# Patient Record
Sex: MALE | Race: WHITE | Employment: OTHER | URBAN - METROPOLITAN AREA
[De-identification: names, ages, dates, MRNs, and addresses within clinical notes are randomized per-mention and may not be internally consistent; named-entity substitution may affect disease eponyms.]

---

## 2017-11-13 PROBLEM — I48.91 ATRIAL FIBRILLATION WITH RVR (HCC): Status: ACTIVE | Noted: 2017-01-01

## 2017-11-15 PROBLEM — I50.43 ACUTE ON CHRONIC COMBINED SYSTOLIC AND DIASTOLIC CONGESTIVE HEART FAILURE (HCC): Status: ACTIVE | Noted: 2017-01-01

## 2017-11-15 PROBLEM — R41.0 ACUTE DELIRIUM: Status: ACTIVE | Noted: 2017-01-01

## 2018-01-01 ENCOUNTER — APPOINTMENT (OUTPATIENT)
Dept: GENERAL RADIOLOGY | Age: 83
DRG: 686 | End: 2018-01-01
Payer: MEDICARE

## 2018-01-01 ENCOUNTER — ANESTHESIA (OUTPATIENT)
Dept: OPERATING ROOM | Age: 83
End: 2018-01-01

## 2018-01-01 ENCOUNTER — HOSPITAL ENCOUNTER (INPATIENT)
Age: 83
LOS: 5 days | DRG: 686 | End: 2018-03-16
Attending: EMERGENCY MEDICINE | Admitting: INTERNAL MEDICINE
Payer: MEDICARE

## 2018-01-01 ENCOUNTER — APPOINTMENT (OUTPATIENT)
Dept: CT IMAGING | Age: 83
DRG: 686 | End: 2018-01-01
Payer: MEDICARE

## 2018-01-01 ENCOUNTER — APPOINTMENT (OUTPATIENT)
Dept: ULTRASOUND IMAGING | Age: 83
DRG: 686 | End: 2018-01-01
Payer: MEDICARE

## 2018-01-01 ENCOUNTER — ANESTHESIA EVENT (OUTPATIENT)
Dept: OPERATING ROOM | Age: 83
End: 2018-01-01

## 2018-01-01 VITALS
BODY MASS INDEX: 32.29 KG/M2 | HEIGHT: 72 IN | DIASTOLIC BLOOD PRESSURE: 90 MMHG | OXYGEN SATURATION: 94 % | HEART RATE: 98 BPM | WEIGHT: 238.4 LBS | RESPIRATION RATE: 24 BRPM | TEMPERATURE: 98.7 F | SYSTOLIC BLOOD PRESSURE: 137 MMHG

## 2018-01-01 DIAGNOSIS — J18.9 PNEUMONIA DUE TO ORGANISM: ICD-10-CM

## 2018-01-01 DIAGNOSIS — R52 PAIN: ICD-10-CM

## 2018-01-01 DIAGNOSIS — R40.4 TRANSIENT ALTERATION OF AWARENESS: Primary | ICD-10-CM

## 2018-01-01 DIAGNOSIS — R31.0 GROSS HEMATURIA: ICD-10-CM

## 2018-01-01 LAB
ALBUMIN SERPL-MCNC: 3.8 G/DL (ref 3.5–5.2)
ALP BLD-CCNC: 81 U/L (ref 40–129)
ALT SERPL-CCNC: 16 U/L (ref 0–40)
ANION GAP SERPL CALCULATED.3IONS-SCNC: 11 MMOL/L (ref 7–16)
ANION GAP SERPL CALCULATED.3IONS-SCNC: 13 MMOL/L (ref 7–16)
ANION GAP SERPL CALCULATED.3IONS-SCNC: 9 MMOL/L (ref 7–16)
ANION GAP SERPL CALCULATED.3IONS-SCNC: 9 MMOL/L (ref 7–16)
AST SERPL-CCNC: 34 U/L (ref 0–39)
B.E.: 7.6 MMOL/L (ref -3–3)
BACTERIA: ABNORMAL /HPF
BASOPHILS ABSOLUTE: 0.04 E9/L (ref 0–0.2)
BASOPHILS ABSOLUTE: 0.04 E9/L (ref 0–0.2)
BASOPHILS RELATIVE PERCENT: 0.5 % (ref 0–2)
BASOPHILS RELATIVE PERCENT: 0.5 % (ref 0–2)
BILIRUB SERPL-MCNC: 0.7 MG/DL (ref 0–1.2)
BILIRUBIN DIRECT: <0.2 MG/DL (ref 0–0.3)
BILIRUBIN URINE: NEGATIVE
BILIRUBIN, INDIRECT: NORMAL MG/DL (ref 0–1)
BLOOD CULTURE, ROUTINE: NORMAL
BLOOD, URINE: ABNORMAL
BUN BLDV-MCNC: 20 MG/DL (ref 8–23)
BUN BLDV-MCNC: 24 MG/DL (ref 8–23)
BUN BLDV-MCNC: 34 MG/DL (ref 8–23)
BUN BLDV-MCNC: 39 MG/DL (ref 8–23)
CALCIUM SERPL-MCNC: 8.4 MG/DL (ref 8.6–10.2)
CALCIUM SERPL-MCNC: 8.5 MG/DL (ref 8.6–10.2)
CALCIUM SERPL-MCNC: 8.8 MG/DL (ref 8.6–10.2)
CALCIUM SERPL-MCNC: 9.1 MG/DL (ref 8.6–10.2)
CHLORIDE BLD-SCNC: 101 MMOL/L (ref 98–107)
CHLORIDE BLD-SCNC: 101 MMOL/L (ref 98–107)
CHLORIDE BLD-SCNC: 94 MMOL/L (ref 98–107)
CHLORIDE BLD-SCNC: 99 MMOL/L (ref 98–107)
CLARITY: ABNORMAL
CO2: 32 MMOL/L (ref 22–29)
CO2: 33 MMOL/L (ref 22–29)
CO2: 33 MMOL/L (ref 22–29)
CO2: 35 MMOL/L (ref 22–29)
COHB: 0.9 % (ref 0–1.5)
COLOR: ABNORMAL
CREAT SERPL-MCNC: 1 MG/DL (ref 0.7–1.2)
CREAT SERPL-MCNC: 1 MG/DL (ref 0.7–1.2)
CREAT SERPL-MCNC: 1.2 MG/DL (ref 0.7–1.2)
CREAT SERPL-MCNC: 1.4 MG/DL (ref 0.7–1.2)
CRITICAL: ABNORMAL
CULTURE, BLOOD 2: NORMAL
DATE ANALYZED: ABNORMAL
DATE OF COLLECTION: ABNORMAL
EKG ATRIAL RATE: 60 BPM
EKG Q-T INTERVAL: 416 MS
EKG QRS DURATION: 120 MS
EKG QTC CALCULATION (BAZETT): 497 MS
EKG R AXIS: -37 DEGREES
EKG T AXIS: -34 DEGREES
EKG VENTRICULAR RATE: 86 BPM
EOSINOPHILS ABSOLUTE: 0.08 E9/L (ref 0.05–0.5)
EOSINOPHILS ABSOLUTE: 0.1 E9/L (ref 0.05–0.5)
EOSINOPHILS RELATIVE PERCENT: 1 % (ref 0–6)
EOSINOPHILS RELATIVE PERCENT: 1.4 % (ref 0–6)
FILM ARRAY ADENOVIRUS: NORMAL
FILM ARRAY BORDETELLA PERTUSSIS: NORMAL
FILM ARRAY CHLAMYDOPHILIA PNEUMONIAE: NORMAL
FILM ARRAY CORONAVIRUS 229E: NORMAL
FILM ARRAY CORONAVIRUS HKU1: NORMAL
FILM ARRAY CORONAVIRUS NL63: NORMAL
FILM ARRAY CORONAVIRUS OC43: NORMAL
FILM ARRAY INFLUENZA A VIRUS 09H1: NORMAL
FILM ARRAY INFLUENZA A VIRUS H1: NORMAL
FILM ARRAY INFLUENZA A VIRUS H3: NORMAL
FILM ARRAY INFLUENZA A VIRUS: NORMAL
FILM ARRAY INFLUENZA B: NORMAL
FILM ARRAY METAPNEUMOVIRUS: NORMAL
FILM ARRAY MYCOPLASMA PNEUMONIAE: NORMAL
FILM ARRAY PARAINFLUENZA VIRUS 1: NORMAL
FILM ARRAY PARAINFLUENZA VIRUS 2: NORMAL
FILM ARRAY PARAINFLUENZA VIRUS 3: NORMAL
FILM ARRAY PARAINFLUENZA VIRUS 4: NORMAL
FILM ARRAY RESPIRATORY SYNCITIAL VIRUS: NORMAL
FILM ARRAY RHINOVIRUS/ENTEROVIRUS: NORMAL
GFR AFRICAN AMERICAN: 58
GFR AFRICAN AMERICAN: >60
GFR NON-AFRICAN AMERICAN: 48 ML/MIN/1.73
GFR NON-AFRICAN AMERICAN: 57 ML/MIN/1.73
GFR NON-AFRICAN AMERICAN: >60 ML/MIN/1.73
GFR NON-AFRICAN AMERICAN: >60 ML/MIN/1.73
GLUCOSE BLD-MCNC: 178 MG/DL (ref 74–109)
GLUCOSE BLD-MCNC: 179 MG/DL (ref 74–109)
GLUCOSE BLD-MCNC: 220 MG/DL (ref 74–109)
GLUCOSE BLD-MCNC: 79 MG/DL (ref 74–109)
GLUCOSE URINE: NEGATIVE MG/DL
HBA1C MFR BLD: 7.8 % (ref 4.8–5.9)
HCO3: 34 MMOL/L (ref 22–26)
HCT VFR BLD CALC: 31.1 % (ref 37–54)
HCT VFR BLD CALC: 32.2 % (ref 37–54)
HCT VFR BLD CALC: 32.2 % (ref 37–54)
HCT VFR BLD CALC: 32.6 % (ref 37–54)
HCT VFR BLD CALC: 32.8 % (ref 37–54)
HCT VFR BLD CALC: 33.6 % (ref 37–54)
HCT VFR BLD CALC: 34.6 % (ref 37–54)
HCT VFR BLD CALC: 34.7 % (ref 37–54)
HCT VFR BLD CALC: 37.1 % (ref 37–54)
HEMOGLOBIN: 10.2 G/DL (ref 12.5–16.5)
HEMOGLOBIN: 10.6 G/DL (ref 12.5–16.5)
HEMOGLOBIN: 10.6 G/DL (ref 12.5–16.5)
HEMOGLOBIN: 10.7 G/DL (ref 12.5–16.5)
HEMOGLOBIN: 10.8 G/DL (ref 12.5–16.5)
HEMOGLOBIN: 11 G/DL (ref 12.5–16.5)
HEMOGLOBIN: 11.1 G/DL (ref 12.5–16.5)
HEMOGLOBIN: 11.9 G/DL (ref 12.5–16.5)
HEMOGLOBIN: 9.7 G/DL (ref 12.5–16.5)
HHB: 8.8 % (ref 0–5)
IMMATURE GRANULOCYTES #: 0.05 E9/L
IMMATURE GRANULOCYTES #: 0.06 E9/L
IMMATURE GRANULOCYTES %: 0.7 % (ref 0–5)
IMMATURE GRANULOCYTES %: 0.7 % (ref 0–5)
INFLUENZA A BY PCR: NOT DETECTED
INFLUENZA B BY PCR: NOT DETECTED
KETONES, URINE: NEGATIVE MG/DL
L. PNEUMOPHILA SEROGP 1 UR AG: NORMAL
LAB: ABNORMAL
LEUKOCYTE ESTERASE, URINE: NEGATIVE
LYMPHOCYTES ABSOLUTE: 0.87 E9/L (ref 1.5–4)
LYMPHOCYTES ABSOLUTE: 1.13 E9/L (ref 1.5–4)
LYMPHOCYTES RELATIVE PERCENT: 11.9 % (ref 20–42)
LYMPHOCYTES RELATIVE PERCENT: 13.8 % (ref 20–42)
Lab: 1535
MCH RBC QN AUTO: 30.5 PG (ref 26–35)
MCH RBC QN AUTO: 31.1 PG (ref 26–35)
MCHC RBC AUTO-ENTMCNC: 32.1 % (ref 32–34.5)
MCHC RBC AUTO-ENTMCNC: 32.8 % (ref 32–34.5)
MCV RBC AUTO: 94.8 FL (ref 80–99.9)
MCV RBC AUTO: 95.1 FL (ref 80–99.9)
METER GLUCOSE: 114 MG/DL (ref 70–110)
METER GLUCOSE: 142 MG/DL (ref 70–110)
METER GLUCOSE: 163 MG/DL (ref 70–110)
METER GLUCOSE: 165 MG/DL (ref 70–110)
METER GLUCOSE: 165 MG/DL (ref 70–110)
METER GLUCOSE: 168 MG/DL (ref 70–110)
METER GLUCOSE: 170 MG/DL (ref 70–110)
METER GLUCOSE: 172 MG/DL (ref 70–110)
METER GLUCOSE: 175 MG/DL (ref 70–110)
METER GLUCOSE: 191 MG/DL (ref 70–110)
METER GLUCOSE: 191 MG/DL (ref 70–110)
METER GLUCOSE: 202 MG/DL (ref 70–110)
METER GLUCOSE: 216 MG/DL (ref 70–110)
METER GLUCOSE: 252 MG/DL (ref 70–110)
METER GLUCOSE: 53 MG/DL (ref 70–110)
METER GLUCOSE: 75 MG/DL (ref 70–110)
METER GLUCOSE: 78 MG/DL (ref 70–110)
METER GLUCOSE: 78 MG/DL (ref 70–110)
METER GLUCOSE: 79 MG/DL (ref 70–110)
METER GLUCOSE: 82 MG/DL (ref 70–110)
METER GLUCOSE: 85 MG/DL (ref 70–110)
METER GLUCOSE: 95 MG/DL (ref 70–110)
METHB: 0.3 % (ref 0–1.5)
MODE: ABNORMAL
MONOCYTES ABSOLUTE: 0.92 E9/L (ref 0.1–0.95)
MONOCYTES ABSOLUTE: 0.99 E9/L (ref 0.1–0.95)
MONOCYTES RELATIVE PERCENT: 12.1 % (ref 2–12)
MONOCYTES RELATIVE PERCENT: 12.6 % (ref 2–12)
NEUTROPHILS ABSOLUTE: 5.32 E9/L (ref 1.8–7.3)
NEUTROPHILS ABSOLUTE: 5.9 E9/L (ref 1.8–7.3)
NEUTROPHILS RELATIVE PERCENT: 71.9 % (ref 43–80)
NEUTROPHILS RELATIVE PERCENT: 72.9 % (ref 43–80)
NITRITE, URINE: NEGATIVE
O2 CONTENT: 16.1 ML/DL
O2 SATURATION: 91.1 % (ref 92–98.5)
O2HB: 90 % (ref 94–97)
OPERATOR ID: ABNORMAL
PATIENT TEMP: 37 C
PCO2: 56.3 MMHG (ref 35–45)
PDW BLD-RTO: 13.8 FL (ref 11.5–15)
PDW BLD-RTO: 13.9 FL (ref 11.5–15)
PH BLOOD GAS: 7.4 (ref 7.35–7.45)
PH UA: 5.5 (ref 5–9)
PLATELET # BLD: 223 E9/L (ref 130–450)
PLATELET # BLD: 249 E9/L (ref 130–450)
PMV BLD AUTO: 9.6 FL (ref 7–12)
PMV BLD AUTO: 9.7 FL (ref 7–12)
PO2: 65.9 MMHG (ref 60–100)
POTASSIUM REFLEX MAGNESIUM: 4 MMOL/L (ref 3.5–5)
POTASSIUM SERPL-SCNC: 4 MMOL/L (ref 3.5–5)
POTASSIUM SERPL-SCNC: 4.3 MMOL/L (ref 3.5–5)
POTASSIUM SERPL-SCNC: 4.5 MMOL/L (ref 3.5–5)
PRO-BNP: 3391 PG/ML (ref 0–450)
PROCALCITONIN: 0.11 NG/ML (ref 0–0.08)
PROTEIN UA: ABNORMAL MG/DL
RBC # BLD: 3.44 E12/L (ref 3.8–5.8)
RBC # BLD: 3.9 E12/L (ref 3.8–5.8)
RBC UA: ABNORMAL /HPF (ref 0–2)
SODIUM BLD-SCNC: 140 MMOL/L (ref 132–146)
SODIUM BLD-SCNC: 142 MMOL/L (ref 132–146)
SODIUM BLD-SCNC: 143 MMOL/L (ref 132–146)
SODIUM BLD-SCNC: 145 MMOL/L (ref 132–146)
SOURCE, BLOOD GAS: ABNORMAL
SPECIFIC GRAVITY UA: 1.01 (ref 1–1.03)
STREP PNEUMONIAE ANTIGEN, URINE: NORMAL
THB: 12.7 G/DL (ref 11.5–16.5)
TIME ANALYZED: 1538
TOTAL PROTEIN: 7.5 G/DL (ref 6.4–8.3)
TROPONIN: 0.03 NG/ML (ref 0–0.03)
URINE CULTURE, ROUTINE: NORMAL
URINE CULTURE, ROUTINE: NORMAL
UROBILINOGEN, URINE: 0.2 E.U./DL
WBC # BLD: 7.3 E9/L (ref 4.5–11.5)
WBC # BLD: 8.2 E9/L (ref 4.5–11.5)
WBC UA: ABNORMAL /HPF (ref 0–5)

## 2018-01-01 PROCEDURE — 6370000000 HC RX 637 (ALT 250 FOR IP): Performed by: INTERNAL MEDICINE

## 2018-01-01 PROCEDURE — 85014 HEMATOCRIT: CPT

## 2018-01-01 PROCEDURE — 94660 CPAP INITIATION&MGMT: CPT

## 2018-01-01 PROCEDURE — 71045 X-RAY EXAM CHEST 1 VIEW: CPT

## 2018-01-01 PROCEDURE — 6360000002 HC RX W HCPCS: Performed by: INTERNAL MEDICINE

## 2018-01-01 PROCEDURE — 97161 PT EVAL LOW COMPLEX 20 MIN: CPT

## 2018-01-01 PROCEDURE — 94640 AIRWAY INHALATION TREATMENT: CPT

## 2018-01-01 PROCEDURE — 87088 URINE BACTERIA CULTURE: CPT

## 2018-01-01 PROCEDURE — 82805 BLOOD GASES W/O2 SATURATION: CPT

## 2018-01-01 PROCEDURE — 6360000004 HC RX CONTRAST MEDICATION: Performed by: RADIOLOGY

## 2018-01-01 PROCEDURE — 2700000000 HC OXYGEN THERAPY PER DAY

## 2018-01-01 PROCEDURE — 97530 THERAPEUTIC ACTIVITIES: CPT

## 2018-01-01 PROCEDURE — 84484 ASSAY OF TROPONIN QUANT: CPT

## 2018-01-01 PROCEDURE — 82962 GLUCOSE BLOOD TEST: CPT

## 2018-01-01 PROCEDURE — 2060000000 HC ICU INTERMEDIATE R&B

## 2018-01-01 PROCEDURE — 99291 CRITICAL CARE FIRST HOUR: CPT

## 2018-01-01 PROCEDURE — 87486 CHLMYD PNEUM DNA AMP PROBE: CPT

## 2018-01-01 PROCEDURE — 97110 THERAPEUTIC EXERCISES: CPT

## 2018-01-01 PROCEDURE — 36415 COLL VENOUS BLD VENIPUNCTURE: CPT

## 2018-01-01 PROCEDURE — 87502 INFLUENZA DNA AMP PROBE: CPT

## 2018-01-01 PROCEDURE — 87503 INFLUENZA DNA AMP PROB ADDL: CPT

## 2018-01-01 PROCEDURE — 88112 CYTOPATH CELL ENHANCE TECH: CPT

## 2018-01-01 PROCEDURE — 83880 ASSAY OF NATRIURETIC PEPTIDE: CPT

## 2018-01-01 PROCEDURE — 2580000003 HC RX 258: Performed by: INTERNAL MEDICINE

## 2018-01-01 PROCEDURE — 70450 CT HEAD/BRAIN W/O DYE: CPT

## 2018-01-01 PROCEDURE — 6370000000 HC RX 637 (ALT 250 FOR IP)

## 2018-01-01 PROCEDURE — 80048 BASIC METABOLIC PNL TOTAL CA: CPT

## 2018-01-01 PROCEDURE — 2580000003 HC RX 258: Performed by: EMERGENCY MEDICINE

## 2018-01-01 PROCEDURE — 85025 COMPLETE CBC W/AUTO DIFF WBC: CPT

## 2018-01-01 PROCEDURE — G8987 SELF CARE CURRENT STATUS: HCPCS

## 2018-01-01 PROCEDURE — 80076 HEPATIC FUNCTION PANEL: CPT

## 2018-01-01 PROCEDURE — 87501 INFLUENZA DNA AMP PROB 1+: CPT

## 2018-01-01 PROCEDURE — 87040 BLOOD CULTURE FOR BACTERIA: CPT

## 2018-01-01 PROCEDURE — 81003 URINALYSIS AUTO W/O SCOPE: CPT

## 2018-01-01 PROCEDURE — 85018 HEMOGLOBIN: CPT

## 2018-01-01 PROCEDURE — 99232 SBSQ HOSP IP/OBS MODERATE 35: CPT | Performed by: INTERNAL MEDICINE

## 2018-01-01 PROCEDURE — 84145 PROCALCITONIN (PCT): CPT

## 2018-01-01 PROCEDURE — G8988 SELF CARE GOAL STATUS: HCPCS

## 2018-01-01 PROCEDURE — 92610 EVALUATE SWALLOWING FUNCTION: CPT | Performed by: SPEECH-LANGUAGE PATHOLOGIST

## 2018-01-01 PROCEDURE — 71260 CT THORAX DX C+: CPT

## 2018-01-01 PROCEDURE — 96365 THER/PROPH/DIAG IV INF INIT: CPT

## 2018-01-01 PROCEDURE — 97165 OT EVAL LOW COMPLEX 30 MIN: CPT

## 2018-01-01 PROCEDURE — 99233 SBSQ HOSP IP/OBS HIGH 50: CPT | Performed by: INTERNAL MEDICINE

## 2018-01-01 PROCEDURE — 76770 US EXAM ABDO BACK WALL COMP: CPT

## 2018-01-01 PROCEDURE — APPSS60 APP SPLIT SHARED TIME 46-60 MINUTES: Performed by: CLINICAL NURSE SPECIALIST

## 2018-01-01 PROCEDURE — 96366 THER/PROPH/DIAG IV INF ADDON: CPT

## 2018-01-01 PROCEDURE — 94667 MNPJ CHEST WALL 1ST: CPT

## 2018-01-01 PROCEDURE — 99222 1ST HOSP IP/OBS MODERATE 55: CPT | Performed by: INTERNAL MEDICINE

## 2018-01-01 PROCEDURE — 96372 THER/PROPH/DIAG INJ SC/IM: CPT

## 2018-01-01 PROCEDURE — 96375 TX/PRO/DX INJ NEW DRUG ADDON: CPT

## 2018-01-01 PROCEDURE — 93005 ELECTROCARDIOGRAM TRACING: CPT | Performed by: EMERGENCY MEDICINE

## 2018-01-01 PROCEDURE — 87450 HC DIRECT STREP B ANTIGEN: CPT

## 2018-01-01 PROCEDURE — 96367 TX/PROPH/DG ADDL SEQ IV INF: CPT

## 2018-01-01 PROCEDURE — 6360000002 HC RX W HCPCS: Performed by: EMERGENCY MEDICINE

## 2018-01-01 PROCEDURE — 87798 DETECT AGENT NOS DNA AMP: CPT

## 2018-01-01 PROCEDURE — 81015 MICROSCOPIC EXAM OF URINE: CPT

## 2018-01-01 PROCEDURE — 99292 CRITICAL CARE ADDL 30 MIN: CPT

## 2018-01-01 PROCEDURE — 2500000003 HC RX 250 WO HCPCS

## 2018-01-01 PROCEDURE — 99223 1ST HOSP IP/OBS HIGH 75: CPT | Performed by: CLINICAL NURSE SPECIALIST

## 2018-01-01 PROCEDURE — 6360000002 HC RX W HCPCS

## 2018-01-01 PROCEDURE — 87581 M.PNEUMON DNA AMP PROBE: CPT

## 2018-01-01 PROCEDURE — 83036 HEMOGLOBIN GLYCOSYLATED A1C: CPT

## 2018-01-01 RX ORDER — DEXTROSE MONOHYDRATE 25 G/50ML
12.5 INJECTION, SOLUTION INTRAVENOUS PRN
Status: DISCONTINUED | OUTPATIENT
Start: 2018-01-01 | End: 2018-03-17 | Stop reason: HOSPADM

## 2018-01-01 RX ORDER — HALOPERIDOL 5 MG/ML
5 INJECTION INTRAMUSCULAR ONCE
Status: COMPLETED | OUTPATIENT
Start: 2018-01-01 | End: 2018-01-01

## 2018-01-01 RX ORDER — FUROSEMIDE 10 MG/ML
40 INJECTION INTRAMUSCULAR; INTRAVENOUS 2 TIMES DAILY
Status: DISCONTINUED | OUTPATIENT
Start: 2018-01-01 | End: 2018-03-17 | Stop reason: HOSPADM

## 2018-01-01 RX ORDER — HALOPERIDOL 5 MG/ML
INJECTION INTRAMUSCULAR
Status: COMPLETED
Start: 2018-01-01 | End: 2018-01-01

## 2018-01-01 RX ORDER — LOSARTAN POTASSIUM 25 MG/1
12.5 TABLET ORAL DAILY
Status: DISCONTINUED | OUTPATIENT
Start: 2018-01-01 | End: 2018-03-17 | Stop reason: HOSPADM

## 2018-01-01 RX ORDER — FUROSEMIDE 10 MG/ML
40 INJECTION INTRAMUSCULAR; INTRAVENOUS ONCE
Status: DISCONTINUED | OUTPATIENT
Start: 2018-01-01 | End: 2018-01-01

## 2018-01-01 RX ORDER — SODIUM CHLORIDE 0.9 % (FLUSH) 0.9 %
10 SYRINGE (ML) INJECTION EVERY 12 HOURS SCHEDULED
Status: DISCONTINUED | OUTPATIENT
Start: 2018-01-01 | End: 2018-01-01 | Stop reason: SDUPTHER

## 2018-01-01 RX ORDER — IPRATROPIUM BROMIDE AND ALBUTEROL SULFATE 2.5; .5 MG/3ML; MG/3ML
SOLUTION RESPIRATORY (INHALATION)
Status: COMPLETED
Start: 2018-01-01 | End: 2018-01-01

## 2018-01-01 RX ORDER — DIGOXIN 0.25 MG/ML
125 INJECTION INTRAMUSCULAR; INTRAVENOUS DAILY
Status: DISCONTINUED | OUTPATIENT
Start: 2018-03-17 | End: 2018-03-17 | Stop reason: HOSPADM

## 2018-01-01 RX ORDER — KETAMINE HYDROCHLORIDE 100 MG/ML
50 INJECTION, SOLUTION INTRAMUSCULAR; INTRAVENOUS ONCE
Status: COMPLETED | OUTPATIENT
Start: 2018-01-01 | End: 2018-01-01

## 2018-01-01 RX ORDER — HALOPERIDOL 5 MG/ML
5 INJECTION INTRAMUSCULAR EVERY 4 HOURS PRN
Status: DISCONTINUED | OUTPATIENT
Start: 2018-01-01 | End: 2018-01-01

## 2018-01-01 RX ORDER — BUDESONIDE 0.5 MG/2ML
500 INHALANT ORAL 2 TIMES DAILY
Status: DISCONTINUED | OUTPATIENT
Start: 2018-01-01 | End: 2018-03-17 | Stop reason: HOSPADM

## 2018-01-01 RX ORDER — ONDANSETRON 2 MG/ML
4 INJECTION INTRAMUSCULAR; INTRAVENOUS EVERY 6 HOURS PRN
Status: DISCONTINUED | OUTPATIENT
Start: 2018-01-01 | End: 2018-03-17 | Stop reason: HOSPADM

## 2018-01-01 RX ORDER — LORAZEPAM 2 MG/ML
INJECTION INTRAMUSCULAR
Status: COMPLETED
Start: 2018-01-01 | End: 2018-01-01

## 2018-01-01 RX ORDER — SODIUM CHLORIDE 0.9 % (FLUSH) 0.9 %
10 SYRINGE (ML) INJECTION EVERY 12 HOURS SCHEDULED
Status: DISCONTINUED | OUTPATIENT
Start: 2018-01-01 | End: 2018-03-17 | Stop reason: HOSPADM

## 2018-01-01 RX ORDER — DIGOXIN 0.25 MG/ML
125 INJECTION INTRAMUSCULAR; INTRAVENOUS ONCE
Status: COMPLETED | OUTPATIENT
Start: 2018-01-01 | End: 2018-01-01

## 2018-01-01 RX ORDER — SODIUM CHLORIDE 0.9 % (FLUSH) 0.9 %
10 SYRINGE (ML) INJECTION PRN
Status: DISCONTINUED | OUTPATIENT
Start: 2018-01-01 | End: 2018-01-01 | Stop reason: SDUPTHER

## 2018-01-01 RX ORDER — LEVOTHYROXINE SODIUM 175 UG/1
175 TABLET ORAL DAILY
Status: DISCONTINUED | OUTPATIENT
Start: 2018-01-01 | End: 2018-03-17 | Stop reason: HOSPADM

## 2018-01-01 RX ORDER — IPRATROPIUM BROMIDE AND ALBUTEROL SULFATE 2.5; .5 MG/3ML; MG/3ML
1 SOLUTION RESPIRATORY (INHALATION) EVERY 4 HOURS PRN
Status: DISCONTINUED | OUTPATIENT
Start: 2018-01-01 | End: 2018-03-17 | Stop reason: HOSPADM

## 2018-01-01 RX ORDER — SODIUM CHLORIDE FOR INHALATION 3 %
4 VIAL, NEBULIZER (ML) INHALATION 2 TIMES DAILY
Status: DISCONTINUED | OUTPATIENT
Start: 2018-01-01 | End: 2018-03-17 | Stop reason: HOSPADM

## 2018-01-01 RX ORDER — KETAMINE HYDROCHLORIDE 10 MG/ML
50 INJECTION, SOLUTION INTRAMUSCULAR; INTRAVENOUS ONCE
Status: DISCONTINUED | OUTPATIENT
Start: 2018-01-01 | End: 2018-03-17 | Stop reason: HOSPADM

## 2018-01-01 RX ORDER — SPIRONOLACTONE 25 MG/1
25 TABLET ORAL DAILY
Status: DISCONTINUED | OUTPATIENT
Start: 2018-01-01 | End: 2018-03-17 | Stop reason: HOSPADM

## 2018-01-01 RX ORDER — FORMOTEROL FUMARATE 20 UG/2ML
20 SOLUTION RESPIRATORY (INHALATION) 2 TIMES DAILY
Status: DISCONTINUED | OUTPATIENT
Start: 2018-01-01 | End: 2018-03-17 | Stop reason: HOSPADM

## 2018-01-01 RX ORDER — KETAMINE HYDROCHLORIDE 10 MG/ML
25 INJECTION, SOLUTION INTRAMUSCULAR; INTRAVENOUS ONCE
Status: DISCONTINUED | OUTPATIENT
Start: 2018-01-01 | End: 2018-03-17 | Stop reason: HOSPADM

## 2018-01-01 RX ORDER — ACETAMINOPHEN 325 MG/1
650 TABLET ORAL EVERY 4 HOURS PRN
Status: DISCONTINUED | OUTPATIENT
Start: 2018-01-01 | End: 2018-03-17 | Stop reason: HOSPADM

## 2018-01-01 RX ORDER — LORAZEPAM 2 MG/ML
1 INJECTION INTRAMUSCULAR
Status: DISCONTINUED | OUTPATIENT
Start: 2018-01-01 | End: 2018-03-17 | Stop reason: HOSPADM

## 2018-01-01 RX ORDER — FUROSEMIDE 40 MG/1
40 TABLET ORAL 2 TIMES DAILY
Status: DISCONTINUED | OUTPATIENT
Start: 2018-01-01 | End: 2018-01-01

## 2018-01-01 RX ORDER — ATROPA BELLADONNA AND OPIUM 16.2; 6 MG/1; MG/1
60 SUPPOSITORY RECTAL EVERY 8 HOURS PRN
Status: DISCONTINUED | OUTPATIENT
Start: 2018-01-01 | End: 2018-03-17 | Stop reason: HOSPADM

## 2018-01-01 RX ORDER — DIGOXIN 0.25 MG/ML
250 INJECTION INTRAMUSCULAR; INTRAVENOUS ONCE
Status: COMPLETED | OUTPATIENT
Start: 2018-01-01 | End: 2018-01-01

## 2018-01-01 RX ORDER — ACETAMINOPHEN 325 MG/1
650 TABLET ORAL EVERY 4 HOURS PRN
Status: DISCONTINUED | OUTPATIENT
Start: 2018-01-01 | End: 2018-01-01 | Stop reason: SDUPTHER

## 2018-01-01 RX ORDER — QUETIAPINE FUMARATE 25 MG/1
25 TABLET, FILM COATED ORAL 2 TIMES DAILY
Status: DISCONTINUED | OUTPATIENT
Start: 2018-01-01 | End: 2018-03-17 | Stop reason: HOSPADM

## 2018-01-01 RX ORDER — ATORVASTATIN CALCIUM 40 MG/1
40 TABLET, FILM COATED ORAL DAILY
Status: DISCONTINUED | OUTPATIENT
Start: 2018-01-01 | End: 2018-03-17 | Stop reason: HOSPADM

## 2018-01-01 RX ORDER — ASPIRIN 81 MG/1
81 TABLET, CHEWABLE ORAL DAILY
Status: DISCONTINUED | OUTPATIENT
Start: 2018-01-01 | End: 2018-01-01

## 2018-01-01 RX ORDER — NICOTINE POLACRILEX 4 MG
15 LOZENGE BUCCAL PRN
Status: DISCONTINUED | OUTPATIENT
Start: 2018-01-01 | End: 2018-03-17 | Stop reason: HOSPADM

## 2018-01-01 RX ORDER — KETAMINE HYDROCHLORIDE 100 MG/ML
INJECTION, SOLUTION INTRAMUSCULAR; INTRAVENOUS
Status: COMPLETED
Start: 2018-01-01 | End: 2018-01-01

## 2018-01-01 RX ORDER — SODIUM CHLORIDE 0.9 % (FLUSH) 0.9 %
10 SYRINGE (ML) INJECTION PRN
Status: DISCONTINUED | OUTPATIENT
Start: 2018-01-01 | End: 2018-03-17 | Stop reason: HOSPADM

## 2018-01-01 RX ORDER — DEXTROSE MONOHYDRATE 50 MG/ML
100 INJECTION, SOLUTION INTRAVENOUS PRN
Status: DISCONTINUED | OUTPATIENT
Start: 2018-01-01 | End: 2018-03-17 | Stop reason: HOSPADM

## 2018-01-01 RX ORDER — ASPIRIN 81 MG/1
81 TABLET ORAL DAILY
Status: DISCONTINUED | OUTPATIENT
Start: 2018-01-01 | End: 2018-03-17 | Stop reason: HOSPADM

## 2018-01-01 RX ORDER — IPRATROPIUM BROMIDE AND ALBUTEROL SULFATE 2.5; .5 MG/3ML; MG/3ML
1 SOLUTION RESPIRATORY (INHALATION)
Status: DISCONTINUED | OUTPATIENT
Start: 2018-01-01 | End: 2018-01-01

## 2018-01-01 RX ORDER — LORAZEPAM 2 MG/ML
0.5 INJECTION INTRAMUSCULAR ONCE
Status: COMPLETED | OUTPATIENT
Start: 2018-01-01 | End: 2018-01-01

## 2018-01-01 RX ORDER — ALBUTEROL SULFATE 2.5 MG/3ML
2.5 SOLUTION RESPIRATORY (INHALATION)
Status: COMPLETED | OUTPATIENT
Start: 2018-01-01 | End: 2018-01-01

## 2018-01-01 RX ORDER — ECHINACEA 400 MG
1 CAPSULE ORAL DAILY
Status: DISCONTINUED | OUTPATIENT
Start: 2018-01-01 | End: 2018-01-01 | Stop reason: CLARIF

## 2018-01-01 RX ORDER — SODIUM CHLORIDE 9 MG/ML
INJECTION, SOLUTION INTRAVENOUS CONTINUOUS
Status: DISCONTINUED | OUTPATIENT
Start: 2018-01-01 | End: 2018-03-17 | Stop reason: HOSPADM

## 2018-01-01 RX ORDER — ISOSORBIDE MONONITRATE 60 MG/1
120 TABLET, EXTENDED RELEASE ORAL DAILY
Status: DISCONTINUED | OUTPATIENT
Start: 2018-01-01 | End: 2018-03-17 | Stop reason: HOSPADM

## 2018-01-01 RX ORDER — METOPROLOL SUCCINATE 50 MG/1
50 TABLET, EXTENDED RELEASE ORAL DAILY
Status: DISCONTINUED | OUTPATIENT
Start: 2018-01-01 | End: 2018-03-17 | Stop reason: HOSPADM

## 2018-01-01 RX ADMIN — IPRATROPIUM BROMIDE AND ALBUTEROL SULFATE 1 AMPULE: .5; 3 SOLUTION RESPIRATORY (INHALATION) at 17:18

## 2018-01-01 RX ADMIN — ASPIRIN 81 MG: 81 TABLET, COATED ORAL at 09:44

## 2018-01-01 RX ADMIN — DEXTROSE MONOHYDRATE 12.5 G: 25 INJECTION, SOLUTION INTRAVENOUS at 07:08

## 2018-01-01 RX ADMIN — SODIUM CHLORIDE SOLN NEBU 3% 4 ML: 3 NEBU SOLN at 13:01

## 2018-01-01 RX ADMIN — AZITHROMYCIN MONOHYDRATE 500 MG: 500 INJECTION, POWDER, LYOPHILIZED, FOR SOLUTION INTRAVENOUS at 15:14

## 2018-01-01 RX ADMIN — INSULIN LISPRO 40 UNITS: 100 INJECTION, SUSPENSION SUBCUTANEOUS at 08:37

## 2018-01-01 RX ADMIN — METOPROLOL SUCCINATE 50 MG: 50 TABLET, FILM COATED, EXTENDED RELEASE ORAL at 04:49

## 2018-01-01 RX ADMIN — FUROSEMIDE 40 MG: 40 TABLET ORAL at 21:31

## 2018-01-01 RX ADMIN — Medication 10 ML: at 09:23

## 2018-01-01 RX ADMIN — INSULIN LISPRO 1 UNITS: 100 INJECTION, SOLUTION INTRAVENOUS; SUBCUTANEOUS at 20:59

## 2018-01-01 RX ADMIN — TIOTROPIUM BROMIDE 18 MCG: 18 CAPSULE ORAL; RESPIRATORY (INHALATION) at 09:02

## 2018-01-01 RX ADMIN — HALOPERIDOL LACTATE 5 MG: 5 INJECTION, SOLUTION INTRAMUSCULAR at 17:25

## 2018-01-01 RX ADMIN — BUDESONIDE 500 MCG: 0.5 SUSPENSION RESPIRATORY (INHALATION) at 21:21

## 2018-01-01 RX ADMIN — FUROSEMIDE 40 MG: 10 INJECTION, SOLUTION INTRAMUSCULAR; INTRAVENOUS at 20:52

## 2018-01-01 RX ADMIN — HALOPERIDOL LACTATE 5 MG: 5 INJECTION, SOLUTION INTRAMUSCULAR at 17:09

## 2018-01-01 RX ADMIN — FORMOTEROL FUMARATE DIHYDRATE 20 MCG: 20 SOLUTION RESPIRATORY (INHALATION) at 08:11

## 2018-01-01 RX ADMIN — Medication 10 ML: at 22:00

## 2018-01-01 RX ADMIN — METOPROLOL SUCCINATE 50 MG: 50 TABLET, FILM COATED, EXTENDED RELEASE ORAL at 09:05

## 2018-01-01 RX ADMIN — IPRATROPIUM BROMIDE AND ALBUTEROL SULFATE 6 ML: .5; 2.5 SOLUTION RESPIRATORY (INHALATION) at 15:41

## 2018-01-01 RX ADMIN — DESMOPRESSIN ACETATE 40 MG: 0.2 TABLET ORAL at 09:20

## 2018-01-01 RX ADMIN — FUROSEMIDE 40 MG: 10 INJECTION, SOLUTION INTRAMUSCULAR; INTRAVENOUS at 13:30

## 2018-01-01 RX ADMIN — DEXTROSE MONOHYDRATE 500 MG: 50 INJECTION, SOLUTION INTRAVENOUS at 13:26

## 2018-01-01 RX ADMIN — MOMETASONE FUROATE AND FORMOTEROL FUMARATE DIHYDRATE 2 PUFF: 200; 5 AEROSOL RESPIRATORY (INHALATION) at 09:50

## 2018-01-01 RX ADMIN — QUETIAPINE FUMARATE 25 MG: 25 TABLET, FILM COATED ORAL at 06:53

## 2018-01-01 RX ADMIN — IPRATROPIUM BROMIDE AND ALBUTEROL SULFATE 1 AMPULE: .5; 3 SOLUTION RESPIRATORY (INHALATION) at 13:16

## 2018-01-01 RX ADMIN — BUDESONIDE 500 MCG: 0.5 SUSPENSION RESPIRATORY (INHALATION) at 09:59

## 2018-01-01 RX ADMIN — FORMOTEROL FUMARATE DIHYDRATE 20 MCG: 20 SOLUTION RESPIRATORY (INHALATION) at 21:21

## 2018-01-01 RX ADMIN — SPIRONOLACTONE 25 MG: 25 TABLET, FILM COATED ORAL at 17:09

## 2018-01-01 RX ADMIN — CEFTRIAXONE 1 G: 1 INJECTION, POWDER, FOR SOLUTION INTRAMUSCULAR; INTRAVENOUS at 15:15

## 2018-01-01 RX ADMIN — IPRATROPIUM BROMIDE AND ALBUTEROL SULFATE 1 AMPULE: .5; 3 SOLUTION RESPIRATORY (INHALATION) at 09:49

## 2018-01-01 RX ADMIN — DIGOXIN 250 MCG: 0.25 INJECTION INTRAMUSCULAR; INTRAVENOUS at 13:02

## 2018-01-01 RX ADMIN — IOPAMIDOL 110 ML: 755 INJECTION, SOLUTION INTRAVENOUS at 12:53

## 2018-01-01 RX ADMIN — ISOSORBIDE MONONITRATE 120 MG: 60 TABLET ORAL at 10:30

## 2018-01-01 RX ADMIN — SODIUM CHLORIDE: 9 INJECTION, SOLUTION INTRAVENOUS at 14:00

## 2018-01-01 RX ADMIN — HALOPERIDOL 5 MG: 5 INJECTION INTRAMUSCULAR at 17:49

## 2018-01-01 RX ADMIN — IPRATROPIUM BROMIDE AND ALBUTEROL SULFATE 1 AMPULE: .5; 3 SOLUTION RESPIRATORY (INHALATION) at 21:29

## 2018-01-01 RX ADMIN — LOSARTAN POTASSIUM 12.5 MG: 25 TABLET, FILM COATED ORAL at 09:06

## 2018-01-01 RX ADMIN — IPRATROPIUM BROMIDE AND ALBUTEROL SULFATE 1 AMPULE: .5; 3 SOLUTION RESPIRATORY (INHALATION) at 17:46

## 2018-01-01 RX ADMIN — DESMOPRESSIN ACETATE 40 MG: 0.2 TABLET ORAL at 09:44

## 2018-01-01 RX ADMIN — MOMETASONE FUROATE AND FORMOTEROL FUMARATE DIHYDRATE 2 PUFF: 200; 5 AEROSOL RESPIRATORY (INHALATION) at 21:29

## 2018-01-01 RX ADMIN — QUETIAPINE FUMARATE 25 MG: 25 TABLET, FILM COATED ORAL at 17:29

## 2018-01-01 RX ADMIN — SODIUM CHLORIDE: 9 INJECTION, SOLUTION INTRAVENOUS at 23:26

## 2018-01-01 RX ADMIN — ISOSORBIDE MONONITRATE 120 MG: 60 TABLET ORAL at 09:05

## 2018-01-01 RX ADMIN — FUROSEMIDE 40 MG: 40 TABLET ORAL at 09:06

## 2018-01-01 RX ADMIN — CEFTRIAXONE 1 G: 1 INJECTION, POWDER, FOR SOLUTION INTRAMUSCULAR; INTRAVENOUS at 16:58

## 2018-01-01 RX ADMIN — AZITHROMYCIN MONOHYDRATE 500 MG: 500 INJECTION, POWDER, LYOPHILIZED, FOR SOLUTION INTRAVENOUS at 15:34

## 2018-01-01 RX ADMIN — FUROSEMIDE 40 MG: 40 TABLET ORAL at 09:44

## 2018-01-01 RX ADMIN — BUDESONIDE 500 MCG: 0.5 SUSPENSION RESPIRATORY (INHALATION) at 10:10

## 2018-01-01 RX ADMIN — HALOPERIDOL 5 MG: 5 INJECTION INTRAMUSCULAR at 17:25

## 2018-01-01 RX ADMIN — SODIUM CHLORIDE SOLN NEBU 3% 4 ML: 3 NEBU SOLN at 21:51

## 2018-01-01 RX ADMIN — LEVOTHYROXINE SODIUM 175 MCG: 0.17 TABLET ORAL at 06:40

## 2018-01-01 RX ADMIN — IPRATROPIUM BROMIDE AND ALBUTEROL SULFATE 1 AMPULE: .5; 3 SOLUTION RESPIRATORY (INHALATION) at 13:46

## 2018-01-01 RX ADMIN — IPRATROPIUM BROMIDE AND ALBUTEROL SULFATE 1 AMPULE: .5; 3 SOLUTION RESPIRATORY (INHALATION) at 05:55

## 2018-01-01 RX ADMIN — HALOPERIDOL 5 MG: 5 INJECTION INTRAMUSCULAR at 17:09

## 2018-01-01 RX ADMIN — KETAMINE HYDROCHLORIDE 50 MG: 100 INJECTION, SOLUTION, CONCENTRATE INTRAMUSCULAR; INTRAVENOUS at 18:10

## 2018-01-01 RX ADMIN — INSULIN LISPRO 40 UNITS: 100 INJECTION, SUSPENSION SUBCUTANEOUS at 09:06

## 2018-01-01 RX ADMIN — ASPIRIN 81 MG: 81 TABLET, COATED ORAL at 09:13

## 2018-01-01 RX ADMIN — IPRATROPIUM BROMIDE AND ALBUTEROL SULFATE 1 AMPULE: .5; 3 SOLUTION RESPIRATORY (INHALATION) at 12:22

## 2018-01-01 RX ADMIN — IPRATROPIUM BROMIDE AND ALBUTEROL SULFATE 1 AMPULE: .5; 3 SOLUTION RESPIRATORY (INHALATION) at 17:06

## 2018-01-01 RX ADMIN — SODIUM CHLORIDE: 9 INJECTION, SOLUTION INTRAVENOUS at 19:25

## 2018-01-01 RX ADMIN — LORAZEPAM 1 MG: 2 INJECTION INTRAMUSCULAR; INTRAVENOUS at 03:26

## 2018-01-01 RX ADMIN — SODIUM CHLORIDE: 9 INJECTION, SOLUTION INTRAVENOUS at 17:02

## 2018-01-01 RX ADMIN — FORMOTEROL FUMARATE DIHYDRATE 20 MCG: 20 SOLUTION RESPIRATORY (INHALATION) at 21:51

## 2018-01-01 RX ADMIN — ALBUTEROL SULFATE 2.5 MG: 2.5 SOLUTION RESPIRATORY (INHALATION) at 13:01

## 2018-01-01 RX ADMIN — LORAZEPAM 1 MG: 2 INJECTION INTRAMUSCULAR; INTRAVENOUS at 21:17

## 2018-01-01 RX ADMIN — HALOPERIDOL LACTATE 5 MG: 5 INJECTION, SOLUTION INTRAMUSCULAR at 14:27

## 2018-01-01 RX ADMIN — BUDESONIDE 500 MCG: 0.5 SUSPENSION RESPIRATORY (INHALATION) at 21:51

## 2018-01-01 RX ADMIN — HALOPERIDOL LACTATE 5 MG: 5 INJECTION, SOLUTION INTRAMUSCULAR at 17:49

## 2018-01-01 RX ADMIN — AZITHROMYCIN MONOHYDRATE 500 MG: 500 INJECTION, POWDER, LYOPHILIZED, FOR SOLUTION INTRAVENOUS at 15:21

## 2018-01-01 RX ADMIN — CEFTRIAXONE 1 G: 1 INJECTION, POWDER, FOR SOLUTION INTRAMUSCULAR; INTRAVENOUS at 16:53

## 2018-01-01 RX ADMIN — MOMETASONE FUROATE AND FORMOTEROL FUMARATE DIHYDRATE 2 PUFF: 200; 5 AEROSOL RESPIRATORY (INHALATION) at 09:02

## 2018-01-01 RX ADMIN — DIGOXIN 125 MCG: 0.25 INJECTION INTRAMUSCULAR; INTRAVENOUS at 15:35

## 2018-01-01 RX ADMIN — INSULIN LISPRO 1 UNITS: 100 INJECTION, SOLUTION INTRAVENOUS; SUBCUTANEOUS at 21:49

## 2018-01-01 RX ADMIN — DESMOPRESSIN ACETATE 40 MG: 0.2 TABLET ORAL at 09:06

## 2018-01-01 RX ADMIN — FORMOTEROL FUMARATE DIHYDRATE 20 MCG: 20 SOLUTION RESPIRATORY (INHALATION) at 10:10

## 2018-01-01 RX ADMIN — ISOSORBIDE MONONITRATE 120 MG: 60 TABLET ORAL at 09:20

## 2018-01-01 RX ADMIN — FUROSEMIDE 40 MG: 40 TABLET ORAL at 21:51

## 2018-01-01 RX ADMIN — FUROSEMIDE 40 MG: 40 TABLET ORAL at 09:43

## 2018-01-01 RX ADMIN — Medication 10 ML: at 20:49

## 2018-01-01 RX ADMIN — SODIUM CHLORIDE: 9 INJECTION, SOLUTION INTRAVENOUS at 22:45

## 2018-01-01 RX ADMIN — CEFTRIAXONE 1 G: 1 INJECTION, POWDER, FOR SOLUTION INTRAMUSCULAR; INTRAVENOUS at 16:36

## 2018-01-01 RX ADMIN — BUDESONIDE 500 MCG: 0.5 SUSPENSION RESPIRATORY (INHALATION) at 08:11

## 2018-01-01 RX ADMIN — FUROSEMIDE 40 MG: 40 TABLET ORAL at 20:56

## 2018-01-01 RX ADMIN — METOPROLOL SUCCINATE 50 MG: 50 TABLET, FILM COATED, EXTENDED RELEASE ORAL at 09:44

## 2018-01-01 RX ADMIN — AZITHROMYCIN MONOHYDRATE 500 MG: 500 INJECTION, POWDER, LYOPHILIZED, FOR SOLUTION INTRAVENOUS at 15:35

## 2018-01-01 RX ADMIN — FUROSEMIDE 40 MG: 40 TABLET ORAL at 20:49

## 2018-01-01 RX ADMIN — LOSARTAN POTASSIUM 12.5 MG: 25 TABLET, FILM COATED ORAL at 09:26

## 2018-01-01 RX ADMIN — INSULIN LISPRO 3 UNITS: 100 INJECTION, SOLUTION INTRAVENOUS; SUBCUTANEOUS at 09:07

## 2018-01-01 RX ADMIN — KETAMINE HYDROCHLORIDE 50 MG: 100 INJECTION, SOLUTION INTRAMUSCULAR; INTRAVENOUS at 18:10

## 2018-01-01 RX ADMIN — IPRATROPIUM BROMIDE AND ALBUTEROL SULFATE 1 AMPULE: .5; 3 SOLUTION RESPIRATORY (INHALATION) at 12:32

## 2018-01-01 RX ADMIN — METOPROLOL SUCCINATE 50 MG: 50 TABLET, FILM COATED, EXTENDED RELEASE ORAL at 09:14

## 2018-01-01 RX ADMIN — TIOTROPIUM BROMIDE 18 MCG: 18 CAPSULE ORAL; RESPIRATORY (INHALATION) at 09:50

## 2018-01-01 RX ADMIN — INSULIN LISPRO 40 UNITS: 100 INJECTION, SUSPENSION SUBCUTANEOUS at 17:22

## 2018-01-01 RX ADMIN — LEVOTHYROXINE SODIUM 175 MCG: 0.17 TABLET ORAL at 07:00

## 2018-01-01 RX ADMIN — SODIUM CHLORIDE: 9 INJECTION, SOLUTION INTRAVENOUS at 03:27

## 2018-01-01 RX ADMIN — AZITHROMYCIN MONOHYDRATE 500 MG: 500 INJECTION, POWDER, LYOPHILIZED, FOR SOLUTION INTRAVENOUS at 15:03

## 2018-01-01 RX ADMIN — LORAZEPAM 0.5 MG: 2 INJECTION INTRAMUSCULAR; INTRAVENOUS at 16:04

## 2018-01-01 RX ADMIN — IPRATROPIUM BROMIDE AND ALBUTEROL SULFATE 1 AMPULE: .5; 3 SOLUTION RESPIRATORY (INHALATION) at 09:02

## 2018-01-01 RX ADMIN — Medication 10 ML: at 15:41

## 2018-01-01 RX ADMIN — ASPIRIN 81 MG: 81 TABLET, COATED ORAL at 09:43

## 2018-01-01 RX ADMIN — DESMOPRESSIN ACETATE 40 MG: 0.2 TABLET ORAL at 09:43

## 2018-01-01 RX ADMIN — LEVOTHYROXINE SODIUM 175 MCG: 0.17 TABLET ORAL at 04:49

## 2018-01-01 RX ADMIN — CEFTRIAXONE 1 G: 1 INJECTION, POWDER, FOR SOLUTION INTRAMUSCULAR; INTRAVENOUS at 17:00

## 2018-01-01 RX ADMIN — IPRATROPIUM BROMIDE AND ALBUTEROL SULFATE 1 AMPULE: .5; 3 SOLUTION RESPIRATORY (INHALATION) at 21:21

## 2018-01-01 RX ADMIN — SPIRONOLACTONE 25 MG: 25 TABLET, FILM COATED ORAL at 09:43

## 2018-01-01 RX ADMIN — ISOSORBIDE MONONITRATE 120 MG: 60 TABLET ORAL at 09:44

## 2018-01-01 RX ADMIN — LEVOTHYROXINE SODIUM 175 MCG: 0.17 TABLET ORAL at 06:53

## 2018-01-01 RX ADMIN — DEXTROSE MONOHYDRATE 12.5 G: 25 INJECTION, SOLUTION INTRAVENOUS at 08:01

## 2018-01-01 RX ADMIN — ASPIRIN 81 MG: 81 TABLET, COATED ORAL at 09:20

## 2018-01-01 RX ADMIN — LOSARTAN POTASSIUM 12.5 MG: 25 TABLET, FILM COATED ORAL at 09:43

## 2018-01-01 RX ADMIN — FORMOTEROL FUMARATE DIHYDRATE 20 MCG: 20 SOLUTION RESPIRATORY (INHALATION) at 09:58

## 2018-01-01 RX ADMIN — SPIRONOLACTONE 25 MG: 25 TABLET, FILM COATED ORAL at 09:06

## 2018-01-01 RX ADMIN — SODIUM CHLORIDE: 9 INJECTION, SOLUTION INTRAVENOUS at 08:00

## 2018-01-01 RX ADMIN — HALOPERIDOL LACTATE 5 MG: 5 INJECTION, SOLUTION INTRAMUSCULAR at 17:38

## 2018-01-01 RX ADMIN — CEFTRIAXONE 1 G: 1 INJECTION, POWDER, FOR SOLUTION INTRAMUSCULAR; INTRAVENOUS at 17:02

## 2018-01-01 RX ADMIN — FUROSEMIDE 40 MG: 40 TABLET ORAL at 09:20

## 2018-01-01 ASSESSMENT — PAIN SCALES - GENERAL
PAINLEVEL_OUTOF10: 0
PAINLEVEL_OUTOF10: 5
PAINLEVEL_OUTOF10: 0

## 2018-01-01 ASSESSMENT — PAIN SCALES - PAIN ASSESSMENT IN ADVANCED DEMENTIA (PAINAD)
NEGVOCALIZATION: 0
TOTALSCORE: 0
FACIALEXPRESSION: 0
BREATHING: 0
CONSOLABILITY: 0
BODYLANGUAGE: 0

## 2018-01-01 ASSESSMENT — ENCOUNTER SYMPTOMS: SHORTNESS OF BREATH: 1

## 2018-01-03 PROBLEM — I50.43 CHF (CONGESTIVE HEART FAILURE), NYHA CLASS I, ACUTE ON CHRONIC, COMBINED (HCC): Status: ACTIVE | Noted: 2018-01-01

## 2018-01-04 PROBLEM — I48.91 ATRIAL FIBRILLATION (HCC): Status: ACTIVE | Noted: 2018-01-01

## 2018-01-04 PROBLEM — Z79.01 CHRONIC ANTICOAGULATION: Status: ACTIVE | Noted: 2018-01-01

## 2018-03-11 PROBLEM — R41.82 ALTERED MENTAL STATUS: Status: ACTIVE | Noted: 2018-01-01

## 2018-03-11 NOTE — ED NOTES
Pt continues to pull on soft bilateral restraints and talk incoherently. Pt breathing hard, moving legs.        Ed Agnes, MARKO  03/11/18 [id number]

## 2018-03-11 NOTE — ED PROVIDER NOTES
HPI:   Tez Byers is a 80 y.o. male presenting to the ED for AMS, beginning two days ago. The complaint has been constant, moderate in severity, and worsened by nothing. Per the patients daughter she believes he has a UTI. The patient currently has urinary retention and has been very confused at his home. He currently lives at home alone. The patient has had recent falls also. According to the patients daughter he has had bleeding from his penis since November. They saw his PCP and the doctor prescribed him antibiotics. The daughter decided to call an ambulance and bring him to the ED instead of the antibiotics because she felt it was too severe. Pt denies cough, congestion, fever, chills, N/V/D, abdominal pain, constipation, chest pain, SOB, headache, urinary problems, neck pain, back pain, or any other symptoms. Pt is currently on Eliquis for Afib. ROS:   Pertinent positives and negatives are stated within HPI, all other systems reviewed and are negative.    --------------------------------------------- PAST HISTORY ---------------------------------------------  Past Medical History:  has a past medical history of Acute on chronic combined systolic and diastolic congestive heart failure ([address] Arthritis; Asbestosis([address]); Atrial fibrillation ([address] Atrial fibrillation with RVR ([facility] CAD (coronary artery disease); Cancer ([address] Chronic anticoagulation; COPD (chronic obstructive pulmonary disease) ([address] Diabetes mellitus ([facility] Hematuria; Hyperlipidemia; Hypertension; Thyroid disease; and Type II or unspecified type diabetes mellitus without mention of complication, not stated as uncontrolled. Past Surgical History:  has a past surgical history that includes Tonsillectomy; Diagnostic Cardiac Cath Lab Procedure (10/1994); Coronary artery bypass graft (10/1994); Skin cancer excision (2/18/14); eye surgery (Left); Cardiac surgery (1994); TURP (5/1/2014); and Colonoscopy.     Social History:

## 2018-03-12 PROBLEM — A41.9 SEPSIS (HCC): Status: ACTIVE | Noted: 2018-01-01

## 2018-03-12 NOTE — CONSULTS
COLONOSCOPY      CORONARY ARTERY BYPASS GRAFT  10/1994    DIAGNOSTIC CARDIAC CATH LAB PROCEDURE  10/1994    EYE SURGERY Left     cataracts    SKIN CANCER EXCISION  2/18/14    TONSILLECTOMY      TURP  5/1/2014       Medications Prior to Admission:    Prescriptions Prior to Admission: JANUVIA 100 MG tablet, Take 100 mg by mouth daily  losartan (COZAAR) 25 MG tablet, Take 0.5 tablets by mouth daily  metoprolol succinate (TOPROL XL) 50 MG extended release tablet, Take 1 tablet by mouth daily  linagliptin (TRADJENTA) 5 MG tablet, Take 5 mg by mouth daily  atorvastatin (LIPITOR) 40 MG tablet, Take 40 mg by mouth daily  Cholecalciferol (VITAMIN D3) 2000 units CAPS, Take by mouth  tiotropium (SPIRIVA) 18 MCG inhalation capsule, Inhale 1 capsule into the lungs daily  apixaban (ELIQUIS) 5 MG TABS tablet, Take 1 tablet by mouth 2 times daily (Patient taking differently: Take 2.5 mg by mouth 2 times daily )  erythromycin (ROMYCIN) 5 MG/GM ophthalmic ointment,   isosorbide mononitrate (IMDUR) 120 MG CR tablet, Take 1 tablet by mouth daily  budesonide-formoterol (SYMBICORT) 160-4.5 MCG/ACT AERO, Inhale 2 puffs into the lungs 2 times daily  albuterol sulfate HFA (VENTOLIN HFA) 108 (90 BASE) MCG/ACT inhaler, Inhale 2 puffs into the lungs every 6 hours as needed for Wheezing  Multiple Vitamins-Minerals (EYE VITAMINS) CAPS, Take  by mouth. LAST DOSE 4/28/14  OXYGEN, Inhale 2 L into the lungs nightly. levothyroxine (SYNTHROID) 175 MCG tablet, Take 175 mcg by mouth Daily. BRING WITH PT DOS  aspirin 81 MG tablet, Take 81 mg by mouth daily. LAST DOSE 4/28/14  Flaxseed, Linseed, (FLAXSEED OIL) 1000 MG CAPS, Take 1 tablet by mouth daily. LAST DOSE 4/28/14  insulin aspart protamine-insulin aspart (NOVOLOG 70/30) (70-30) 100 UNIT/ML injection, Inject into the skin 2 times daily (with meals) 40 units BID  furosemide (LASIX) 40 MG tablet, Take 40 mg by mouth 2 times daily.     Allergies:    Ace inhibitors    Social History:    reports

## 2018-03-12 NOTE — CONSULTS
CHIEF COMPLAINT:  COPD, respiratory failure, pneumonia    HPI: Mr. Valerie Salazar is an 15-year-old male, with a past medical history of atrial fibrillation on apixaban, CHF, COPD, diabetes, who presents from the emergency department with his daughter for increased confusion, falls at home and hematuria. Patient has been experiencing hematuria since November 2017. In addition, the patient has had increased confusion from his baseline for about the past week and having hallucinations where he is reporting squirrels running around. According to family, the patient has not been complaining of any shortness of breath or appearing more dyspneic than his baseline. He is normally on 2 L/m nasal cannula at home. The daughter reports an occasional nonproductive cough and wheezing this morning, but no other respiratory complaints. In the Emergency department, the patient was afebrile with a normal white blood cell count and an elevated pro BNP of 3391. Arterial blood gas showed hypercapnia with metabolic alkalosis likely chronic from COPD. CT scan of the head was negative. Chest x-ray was concerning for possible pneumonia so he was started on empiric antibiotics with azithromycin and ceftriaxone. Follow-up CT scan of the chest was revealing of extensive pleural calcification involving both lungs and diaphragms as well as left hilar lymphadenopathy concerning for possible malignancy. The patient does have a known history of asbestos exposure is used to work as a  and also has a 50-pack-year tobacco history (quit in 1997). Patient is noted to be significantly agitated the emergency department and required multiple doses of various medications for sedation including Ativan, Haldol and ketamine. He remained somnolent this morning and does not contribute to history.       Past Medical History:    Past Medical History:   Diagnosis Date    Acute on chronic combined systolic and diastolic congestive heart failure intolerance and cold intolerance      PHYSICAL EXAM:    Vitals:  BP (!) 116/53   Pulse 84   Temp 99.1 °F (37.3 °C) (Axillary)   Resp 14   Ht 6' (1.829 m)   Wt 227 lb 4.8 oz (103.1 kg)   SpO2 99%   BMI 30.83 kg/m²     General:  Somnolent briefly awakens to voice and touch. No apparent distress on nasal cannula at 5 L/m. HEENT:  Normocephalic, atraumatic. Pupils equal, round, reactive to light. No scleral icterus. No conjunctival injection. Normal lips, teeth, and gums. No nasal discharge.   Neck:  Supple; no bruits  Heart:  RRR, no murmurs, gallops, rubs  Lungs: Faint bilateral rhonchi with expiratory wheeze on the right; equal expansion, no accessory muscle use or retractions  Abdomen:  BS+, soft, nontender nondistended, no masses, no organomegaly  Genitourinary: Dark red blood in the Pederson collection bag  Extremities:  No clubbing, cyanosis, or edema  Skin:  Warm and dry, no open lesions or rash  Neuro:  Cranial nerves 2-12 intact, no focal deficits    LABS:  Recent Results (from the past 24 hour(s))   EKG 12 Lead    Collection Time: 03/11/18 11:02 AM   Result Value Ref Range    Ventricular Rate 86 BPM    Atrial Rate 60 BPM    QRS Duration 120 ms    Q-T Interval 416 ms    QTc Calculation (Bazett) 497 ms    R Axis -37 degrees    T Axis -34 degrees   CBC auto differential    Collection Time: 03/11/18 11:13 AM   Result Value Ref Range    WBC 8.2 4.5 - 11.5 E9/L    RBC 3.90 3.80 - 5.80 E12/L    Hemoglobin 11.9 (L) 12.5 - 16.5 g/dL    Hematocrit 37.1 37.0 - 54.0 %    MCV 95.1 80.0 - 99.9 fL    MCH 30.5 26.0 - 35.0 pg    MCHC 32.1 32.0 - 34.5 %    RDW 13.9 11.5 - 15.0 fL    Platelets 091 052 - 255 E9/L    MPV 9.7 7.0 - 12.0 fL    Neutrophils % 71.9 43.0 - 80.0 %    Immature Granulocytes % 0.7 0.0 - 5.0 %    Lymphocytes % 13.8 (L) 20.0 - 42.0 %    Monocytes % 12.1 (H) 2.0 - 12.0 %    Eosinophils % 1.0 0.0 - 6.0 %    Basophils % 0.5 0.0 - 2.0 %    Neutrophils # 5.90 1.80 - 7.30 E9/L    Immature Granulocytes # H/H  5. Check pro calcitonin and respiratory film array, Legionella and strep urinary agent. Follow cultures  6. Poor prognosis  7. Discuss with Dr. Claude Beach, DO PGY-2  8:48 AM  3/12/2018    I personally saw, examined, and cared for the patient. Labs, medications, radiographs reviewed. I agree with history exam and plans detailed in resident note. Check abg to evaluate for rising CO2 levels if mentation worsens   Start nippv to help with ventilation and oxygenation   Kimber Montero M.D.    Pulmonary/Critical Care Medicine

## 2018-03-12 NOTE — PROGRESS NOTES
P Quality Flow/Interdisciplinary Rounds Progress Note        Quality Flow Rounds held on March 12, 2018    Disciplines Attending:  Bedside Nurse, ,  and Nursing Unit Leadership    Renu Wills was admitted on 3/11/2018 10:34 AM    Anticipated Discharge Date:  Expected Discharge Date: 03/15/18    Disposition:    Eric Score:  Eric Scale Score: 16    Readmission Risk              Readmission Risk:        24.75       Age 72 or Greater:  1    Admitted from SNF or Requires Paid or Family Care:  0    Currently has CHF,COPD,ARF,CRI,or is on dialysis:  4    Takes more than 5 Prescription Medications:  4    Takes Digoxin,Insulin,Anticoagulants,Narcotics or ASA/Plavix:  1315 Merged with [facility] in Past 12 Months:  10    On Disability:  0    Patient Considers own Health:  3.75          Discussed patient goal for the day, patient clinical progression, and barriers to discharge. The following Goal(s) of the Day/Commitment(s) have been identified:  Check Urology plan.       Lilliam Rodriguez  March 12, 2018

## 2018-03-12 NOTE — PLAN OF CARE
Problem: Falls - Risk of  Goal: Absence of falls  Outcome: Ongoing      Problem: Risk for Impaired Skin Integrity  Goal: Tissue integrity - skin and mucous membranes  Structural intactness and normal physiological function of skin and  mucous membranes.    Outcome: Met This Shift

## 2018-03-12 NOTE — H&P
1000 MG CAPS, Take 1 tablet by mouth daily. LAST DOSE 4/28/14  insulin aspart protamine-insulin aspart (NOVOLOG 70/30) (70-30) 100 UNIT/ML injection, Inject into the skin 2 times daily (with meals) 40 units BID  furosemide (LASIX) 40 MG tablet, Take 40 mg by mouth 2 times daily. Allergies:    Ace inhibitors    Social History:    reports that he quit smoking about 20 years ago. His smoking use included Cigarettes. He has a 50.00 pack-year smoking history. He has never used smokeless tobacco. He reports that he drinks alcohol. He reports that he does not use drugs. Family History:   family history includes Arthritis (age of onset: 66) in his sister. REVIEW OF SYSTEMS:  As above in the HPI, otherwise negative    PHYSICAL EXAM:    Vitals:  BP (!) 116/53   Pulse 84   Temp 99.1 °F (37.3 °C) (Axillary)   Resp 14   Ht 6' (1.829 m)   Wt 227 lb 4.8 oz (103.1 kg)   SpO2 99%   BMI 30.83 kg/m²     General:   Awake, alert, restless and agitated. Confused and unable to engage. HEENT:    Normocephalic, atraumatic. Pupils equal, round, reactive to light. No scleral icterus. No conjunctival injection. Oropharynx clear. No nasal discharge. Neck:       Supple. No bruits, adenopathy, masses, neck vein distention. Trachea in the midline. Heart:      Irrg, no murmurs, gallops, rubs  Lungs:     CTA bilaterally, bilat symmetrical expansion, no wheeze, rales, or rhonchi  Abdomen: Bowel sounds present, soft, nontender, no masses, no organomegaly, no peritoneal signs  Extremities:  No clubbing, cyanosis.  Mild LE edema  Skin:         Warm and dry, no open lesions or rash  Neuro:     Cranial nerves 2-12 intact, no focal deficits  Rectal:    deferred  Genitalia:  Pederson with bloody urine    LABS:    CBC with Differential:    Lab Results   Component Value Date    WBC 7.3 03/12/2018    RBC 3.44 03/12/2018    HGB 10.7 03/12/2018    HCT 32.6 03/12/2018     03/12/2018    MCV 94.8 03/12/2018    MCH 31.1 03/12/2018    [facility]

## 2018-03-13 NOTE — PROGRESS NOTES
Poor                              Comments: Upon arrival pt supine in bed. At end of session pt seated on BSC with all devices within reach, all lines and tubes intact. Treatment: Pt supine in bed upon therapist arrival and family is able to provide PLOF and social history. Pt wakes to name and is gvien assistance in bed mobility to sit EOB. Pt was able to verbalize needing to have a bowel movement and was assisted to stand with use of ww and transfer to [facility]. Pt was left seated on BSC stating needing extended time. Pt family sitting in room and both parties demonstrate good understanding of call light.       Assessment of current deficits   Functional mobility [x]  ADLs [x] Strength [x]  Cognition [x]  Functional transfers  [x] IADLs [x] Safety Awareness [x]  Endurance [x]  Fine Motor Coordination [] Balance [x] Vision/perception [] Sensation []   Gross Motor Coordination [x] ROM []       Eval Complexity: low      Treatment frequency: 2-4 x week      Plan of Care:  ADL retraining [x]   Equipment needs [x]   Neuromuscular re-education []  Energy Conservation Techniques [x]  Functional Transfer training [x]  Patient and/or Family Education [x]  Functional Mobility training [x]  Environmental Modifications [x]  Cognitive re-training []    Compensatory techniques for ADLs [x]  Splinting Needs []   Positioning to improve overall function [x]  Other: []      Rehab Potential: Good      Short term goals  Time Frame: 5 days  STG #1:  Increase feeding to IND         STG #2:  Increase grooming to Setup in sitting  STGl #3   Increase UE dressing and bathing to setup    STG #4   Increase LE dressing and bathing to Min A with AE prn   STG #5   Increase functional commode transfer to Min A with ww  STG #6   Increase safety and problem solving to good during ADL's  STG #7   Increase functional activity tolerance to fair for 5 minutes of standing in ADL tasks      Patient and/or family understands diagnosis, prognosis and plan of care: Yes    [] Malnutrition indicators have been identified and nursing has been notified to ensure a dietitian consult is ordered.      Time in: 11:10  Evaluation Only    Ashley Grant OTR/L #[id number]

## 2018-03-13 NOTE — PROGRESS NOTES
Daily Pulmonary Progress Note    Patient being followed for COPD, acute on chronic respiratory failure with hypoxia, pneumonia     Subjective:  Gustavo Degroot is a 80 y.o.  male lying in bed in no apparent distress. Tolerating current oxygen therapy 5 liters  NC . Wears 2 liters at home. Appears weak and lethargic. Has not used NIPPV . IV fluids at 75/hr. Positive for cough. Not expectorating. Family at bedside, questions answered. Recent bronchodilator treatment given. ROS:  Unable to obtain      Objective:  Vitals: /72   Pulse 96   Temp 98.5 °F (36.9 °C) (Oral)   Resp 18   Ht 6' (1.829 m)   Wt 231 lb 9.6 oz (105.1 kg)   SpO2 90%   BMI 31.41 kg/m²      I/O last 3 completed shifts: In: 1329.5 [I.V.:1029.5; IV Piggyback:300]  Out: 4728 [Urine:1375]    Daily Lab Values:   CBC:   Lab Results   Component Value Date    WBC 7.3 03/12/2018    RBC 3.44 03/12/2018    HGB 11.0 03/13/2018    HCT 34.6 03/13/2018    MCV 94.8 03/12/2018    MCH 31.1 03/12/2018    MCHC 32.8 03/12/2018    RDW 13.8 03/12/2018     03/12/2018    MPV 9.6 03/12/2018     BMP:    Lab Results   Component Value Date     03/12/2018    K 4.0 03/12/2018     03/12/2018    CO2 35 03/12/2018    BUN 34 03/12/2018    LABALBU 3.8 03/11/2018    CREATININE 1.2 03/12/2018    CALCIUM 8.8 03/12/2018    GFRAA >60 03/12/2018    LABGLOM 57 03/12/2018     PT/INR:    Lab Results   Component Value Date    PROTIME 12.0 11/13/2017    INR 1.1 11/13/2017            SpO2 Readings from Last 1 Encounters:   03/13/18 90%      Results for Isrrael Richter (MRN [id number]) as of 3/13/2018 10:48   Ref. Range 3/12/2018 16:10   Procalcitonin Latest Ref Range: 0.00 - 0.08 ng/mL 0.11 (H)     ALERT:  THIS IS AN ABNORMAL REPORT. Findings communicated   directly with Dr. Adriana Burger at approximately 3/11/2018 12:11 PM .   1. Multiple bilateral nodular abnormalities concerning for metastatic   disease and/or malignancy.  Further evaluation with CT scan the chest is recommended. Patient also has known bilateral calcified pleural   plaques. 2. Stable, enlarged cardiomediastinal silhouette. .   3. Vascular calcifications thoracic aorta. 4. Suspected bibasilar infiltrate/pneumonia and bilateral pleural   effusions. 5. Right azygos lobe. Chest Radiographs have all been reviewed   Medications and labs have all been reviewed.   Current Facility-Administered Medications   Medication Dose Route Frequency Provider Last Rate Last Dose    haloperidol lactate (HALDOL) injection 5 mg  5 mg Intramuscular Q4H PRN Jori Chicasc, DO   5 mg at 03/12/18 1427    ipratropium-albuterol (DUONEB) nebulizer solution 1 ampule  1 ampule Inhalation Q4H PRN Jori Chicasc, DO   1 ampule at 03/12/18 0555    opium-belladonna (B&O SUPPRETTES) 16.2-60 MG suppository 60 mg  60 mg Rectal Q8H PRN Danielle Donald, CNP        ketamine (KETALAR) injection 50 mg  50 mg Intramuscular Once Татьяна Robert, DO        ketamine (KETALAR) injection 25 mg  25 mg Intramuscular Once Татьяна Robert, DO        ketamine (KETALAR) injection 25 mg  25 mg Intravenous Once Татьяна Jakob, DO        sodium chloride flush 0.9 % injection 10 mL  10 mL Intravenous 2 times per day Татьяна Robert, DO   10 mL at 03/12/18 0923    sodium chloride flush 0.9 % injection 10 mL  10 mL Intravenous PRN Vern Ohara, DO        acetaminophen (TYLENOL) tablet 650 mg  650 mg Oral Q4H PRN Татьяна Robert, DO        aspirin EC tablet 81 mg  81 mg Oral Daily Jori Mehta, DO   81 mg at 03/13/18 0944    atorvastatin (LIPITOR) tablet 40 mg  40 mg Oral Daily Jori P Popovec, DO   40 mg at 03/13/18 0944    mometasone-formoterol (DULERA) 200-5 MCG/ACT inhaler 2 puff  2 puff Inhalation BID Jori Chicasc, DO   2 puff at 03/13/18 0902    furosemide (LASIX) tablet 40 mg  40 mg Oral BID Jori RUTH Popovec, DO   40 mg at 03/13/18 0944    insulin lispro protamine & lispro (HUMALOG MIX) (75-25) 100 UNIT per ML injection vial SUSP 40 Units  40 Units Subcutaneous Intravenous PRN Jori Chicasc, DO        glucagon (rDNA) injection 1 mg  1 mg Intramuscular PRN Jori Waiteovec, DO        dextrose 5 % solution  100 mL/hr Intravenous PRN Jori Chicasc, DO         PHYSICAL EXAM:  General Appearance:    Lying in bed in no acute distress. Appears comfortable. Head:  Normocephalic atraumatic without obvious abnormality   Throat:  Lips, mucosa, and tongue normal.   Neck:  Supple, symmetrical, trachea midline, no adenopathy;     thyroid:  no enlargement/tenderness/nodules or JVD. Lungs:   Breath sounds diminished faint wheeze improved. Respirations   unlabored. Symmetrical expansion. Heart:   Regular rate and rhythm, S1 and S2 normal, no murmur, rub   or gallop. Abdomen:    Soft, non-tender, bowel sounds active all four quadrants,     no masses, no organomegaly, no bruit. Extremities  Extremities normal, no cyanosis, clubbing, or edema. Capillary refill <3 seconds. Skin:   Warm and dry. Skin color, texture, turgor normal. No rashes,    bleeding,  or lesions. See wound care assessment. ASSESSMENT:  1. Suspect mesothelioma in the setting of asbestos exposure - concerning for possible metastasis  2. Acute on chronic COPD with hypoxia - requiring increased nasal cannula oxygen  3. Acute on chronic CHF - pro BNP 70 elevated from previous in the setting of slightly diminished renal function  4. History of atrial fibrillation - on apixaban chronically, currently held  5. Doubt pneumonia  6. Encephalopathy - concern for possible metastatic disease not seen on CT scan  7. Hematuria with a history of bladder carcinoma- urology following.  Renal ultrasound planned and possible cystoscopy  Principal Problem:    Altered mental status  Active Problems:    Coronary artery disease involving native coronary artery of native heart without angina pectoris    Diastolic CHF, chronic (HCC)    COPD with hypoxia (HCC)    Diabetes

## 2018-03-13 NOTE — PROGRESS NOTES
Mobility  Rolling: NT  Supine to sit: ModA (trunk with elevated HOB)  Sit to supine: NT  Scooting: Maximo (seated EOB)  Maximo   Transfers Sit to stand: MaxA  Stand to sit: ModA with VCs for hand placement  Stand pivot: Maximo  Maximo   Ambulation   NT  75 feet with FWW with SBA       Stair negotiation: ascended and descended NT  7 steps with 2 rail with SBA   LE ROM  WFL     LE strength  Grossly 4-/5 throughout B LEs, except for hips 2+/5     AM- PAC RAW score  14/24       Balance: Standing static with Maximo  Sensation: No numbness/tingling reported  Edema: None noted  Endurance: Decreased due to weakness and fatigue  Chair alarm: Enabled; Pt      ASSESSMENT  Pt displays functional ability as noted in the objective portion of this evaluation. Comments/Treatment:  Pt left on bed side commode with call button in reach and family present. Returned to assist pt from [facility] to chair        Examination of body systems Decreased   Functional mobility x   ROM x   Strength x   Safety Awareness    Cognition x   Endurance x   Sensation    Balance x   Vision/Visual Deficits    Coordination        Patient education  Pt educated on transfer safety/mechanics and bed exercises    Patient response to education:   Pt verbalized understanding Pt demonstrated skill Pt requires further education in this area   no With verbal cues Needs reinforced     Rehab potential is Good for reaching above PT goals. Pts/ family goals   1. Return Home    Patient and or family understand(s) diagnosis, prognosis, and plan of care. - yes    PLAN  PT care will be provided in accordance with the objectives noted above. Whenever appropriate, clear delegation orders will be provided for nursing staff. Exercises and functional mobility practice will be used as well as appropriate assistive devices or modalities to obtain goals. Patient and family education will also be administered as needed. Frequency of treatments will be 2-3x/week 3-5 days.     Time in: 1110  Time out: Angelia Madrid, [facility]    Arlene 18 number:  PT [id number]

## 2018-03-13 NOTE — PROGRESS NOTES
Dr. Opal Nieto called back, he gave orders to keep patient NPO for possible cysto. Call placed to Dr. Nicol Huitron regarding medical clearance, he is ok with patient going to have cysto if mentation in am is conducive to transfer.  Electronically signed by Consuelo Neves RN on 3/13/2018 at 4:59 PM

## 2018-03-13 NOTE — PROGRESS NOTES
Patient's family put the call bell on for IV that was beeping. Susan Hernandez, the watson clerk, asked if I could help and shut the IV off. When I walked into the room to help, the patient's daughter stated\", all they are doing is sitting around doing nothing, and I have a video to prove it. \"  I explained to the daughter, that I was not her nurse but came to help out, and just because you see us sitting doesn't mean we are not doing anything. I just hung up the phone for giving report for transporting one of my patient's to another floor. She says , \"well, isn't it suppose to be teamwork\"? I said that is why I am here. She said it took you long enough\". I left the room and said this is ridiculous.

## 2018-03-13 NOTE — PROGRESS NOTES
Family requesting sitter at bedtime, Dr. Fred Matias wrote nursing communication for sitter at bedtime. Family requested to talk to staff regarding confirmation for sitter tonight. Called staffing, spoke to Randy Gaona, he could not confirm or promise a sitter d/t [place] needs at this time, however will work diligently to get coverage. I spoke with daughters at bedside, I expressed our concern for his safety and desire to meet both patient and family needs. I told them staffing was aware of request and we would do everything in our power to ensure he had a sitter.  I offered to move patient to other side of the thomason to be in view of , family declined, family refused TAMERA as well, removed from room at their request.  Electronically signed by Brown Modi RN on 3/13/2018 at 1:08 PM

## 2018-03-13 NOTE — PROGRESS NOTES
3/13/2018 7:48 AM  Service: Urology  Group: [facility] ([facility]/Marisel/Jass)    Matthias Bishop  [id number]    Subjective:  Afebrile  More alert today  Still somewhat confused  Conversing  Eating breakfast  No complaints of pain  Tolerating garner  Urine Red    Review of Systems  Unable to obtain due to confusion    Scheduled Meds:   ketamine  50 mg Intramuscular Once    ketamine  25 mg Intramuscular Once    ketamine  25 mg Intravenous Once    sodium chloride flush  10 mL Intravenous 2 times per day    aspirin  81 mg Oral Daily    atorvastatin  40 mg Oral Daily    mometasone-formoterol  2 puff Inhalation BID    furosemide  40 mg Oral BID    insulin lispro protamine & lispro  40 Units Subcutaneous BID WC    isosorbide mononitrate  120 mg Oral Daily    levothyroxine  175 mcg Oral Daily    losartan  12.5 mg Oral Daily    metoprolol succinate  50 mg Oral Daily    tiotropium  18 mcg Inhalation Daily    sodium chloride flush  10 mL Intravenous 2 times per day    ipratropium-albuterol  1 ampule Inhalation Q4H WA    azithromycin  500 mg Intravenous Q24H    And    cefTRIAXone (ROCEPHIN) IV  1 g Intravenous Q24H    insulin lispro  0-6 Units Subcutaneous TID WC    insulin lispro  0-3 Units Subcutaneous Nightly       Objective:  Vitals:    03/13/18 0000   BP: 128/83   Pulse: 95   Resp: 17   Temp: 98 °F (36.7 °C)   SpO2:          Allergies: Ace inhibitors    General Appearance: alert, conversing, confused. Skin: no rash or erythema  Head: normocephalic and atraumatic  Pulmonary/Chest: normal air movement, no respiratory distress   Abdomen: soft, non-tender, non-distended  Garner well positioned and draining red urine    Labs:     Recent Labs      03/12/18   0654   NA  145   K  4.0   CL  101   CO2  35*   BUN  34*   CREATININE  1.2   GLUCOSE  178*   CALCIUM  8.8       Lab Results   Component Value Date    HGB 10.2 03/12/2018    HCT 32.2 03/12/2018     3/12/18 Renal US   Impression   1.  No evidence of

## 2018-03-13 NOTE — PROGRESS NOTES
Call placed to Dr. Hemalatha Bailey answering service regarding possible cysto tomorrow, awaiting call back/orders if warranted.   Electronically signed by Josselyn Palacios RN on 3/13/2018 at 4:34 PM

## 2018-03-13 NOTE — FLOWSHEET NOTE
Patient's garner catheter irrigated without difficulty with 60 ml of sterile normal saline, with return of clear yellow noted. Patient tolerated procedure well.

## 2018-03-13 NOTE — CONSULTS
Inpatient Cardiology Consultation      Reason for Consult:  Atrial fibrillation; advice regarding anticoagulation    Consulting Physician: Dr. Pam Hicks    Requesting Physician:  Dr. Daisy Wray    Date of Consultation: 3/13/2018    History of Present Illness:    Mr. Willy Yanes is an 80year old gentleman who is followed at our practice by Dr. Bob; he was last seen by him in outpatient follow up on February 27, 2018. He has a history of coronary artery disease, congestive heart failure, and atrial fibrillation. Due to hematuria, his Eliquis dose was reduced to 2.5 mg twice daily (from 5 mg twice daily) by his primary care physician. During his last appointment with  Dr Bob, he and his family were advised further urologic evaluation due to his elevated stroke risk and no significant prophylaxis on the reduced Eliquis dose. He was brought to the ED on March 11 after three to four days of confusion and hallucinations; his daughter states that he has been having hematuria since November \"and nothing is being done\". In the ED, proBNP was 3391, CXR was read as showing multiple pulmonary nodules, bilateral calcified pleural plaques, and vascular calcifications. He was admitted to telemetry and Eliquis was placed on hold due to his hematuria. He was seen in consultation by pulmonology and recommended Bipap, but his family declined. He is now scheduled for cystoscopy and possible bladder biopsy tomorrow. [id number] [facility] Cardiology was consulted regarding his history of atrial fibrillation and advice regarding anticoagulation. Currently, Mr. Willy Yanes is resting in bed and appears ill but in no acute distress; he is unable to provide significant history, therefore information for the consult is obtained from chart review and discussion with his daughter.  He lives alone, but is family is in frequent contact with him, and to the best of his daughter's knowledge he has had no complaints of chest discomfort, dizziness, or syncope and no Provider, MD   isosorbide mononitrate (IMDUR) 120 MG CR tablet Take 1 tablet by mouth daily 6/9/16   Nohemi Rao MD   budesonide-formoterol [facility]) 160-4.5 MCG/ACT AERO Inhale 2 puffs into the lungs 2 times daily 12/18/15   Gwenevere Lesches, MD   albuterol sulfate HFA (VENTOLIN HFA) 108 (90 BASE) MCG/ACT inhaler Inhale 2 puffs into the lungs every 6 hours as needed for Wheezing 12/18/15   Gwenevere Lesches, MD   Multiple Vitamins-Minerals (EYE VITAMINS) CAPS Take  by mouth. LAST DOSE 4/28/14    Historical Provider, MD   OXYGEN Inhale 2 L into the lungs nightly. Historical Provider, MD   levothyroxine (SYNTHROID) 175 MCG tablet Take 175 mcg by mouth Daily. BRING WITH PT DOS    Historical Provider, MD   aspirin 81 MG tablet Take 81 mg by mouth daily. LAST DOSE 4/28/14    Historical Provider, MD   Flaxseed, Linseed, (FLAXSEED OIL) 1000 MG CAPS Take 1 tablet by mouth daily. LAST DOSE 4/28/14    Historical Provider, MD   insulin aspart protamine-insulin aspart (NOVOLOG 70/30) (70-30) 100 UNIT/ML injection Inject into the skin 2 times daily (with meals) 40 units BID    Historical Provider, MD   furosemide (LASIX) 40 MG tablet Take 40 mg by mouth 2 times daily.     Historical Provider, MD       Current Medications:    Current Facility-Administered Medications: formoterol (PERFOROMIST) nebulizer solution 20 mcg, 20 mcg, Nebulization, BID  budesonide (PULMICORT) nebulizer suspension 500 mcg, 500 mcg, Nebulization, BID  haloperidol lactate (HALDOL) injection 5 mg, 5 mg, Intramuscular, Q4H PRN  ipratropium-albuterol (DUONEB) nebulizer solution 1 ampule, 1 ampule, Inhalation, Q4H PRN  opium-belladonna (B&O SUPPRETTES) 16.2-60 MG suppository 60 mg, 60 mg, Rectal, Q8H PRN  ketamine (KETALAR) injection 50 mg, 50 mg, Intramuscular, Once  ketamine (KETALAR) injection 25 mg, 25 mg, Intramuscular, Once  ketamine (KETALAR) injection 25 mg, 25 mg, Intravenous, Once  sodium chloride flush 0.9 % injection 10 mL, 10 mL, Intravenous, 2 times per day  sodium chloride flush 0.9 % injection 10 mL, 10 mL, Intravenous, PRN  acetaminophen (TYLENOL) tablet 650 mg, 650 mg, Oral, Q4H PRN  aspirin EC tablet 81 mg, 81 mg, Oral, Daily  atorvastatin (LIPITOR) tablet 40 mg, 40 mg, Oral, Daily  furosemide (LASIX) tablet 40 mg, 40 mg, Oral, BID  insulin lispro protamine & lispro (HUMALOG MIX) (75-25) 100 UNIT per ML injection vial SUSP 40 Units, 40 Units, Subcutaneous, BID WC  isosorbide mononitrate (IMDUR) extended release tablet 120 mg, 120 mg, Oral, Daily  levothyroxine (SYNTHROID) tablet 175 mcg, 175 mcg, Oral, Daily  losartan (COZAAR) tablet 12.5 mg, 12.5 mg, Oral, Daily  metoprolol succinate (TOPROL XL) extended release tablet 50 mg, 50 mg, Oral, Daily  0.9 % sodium chloride infusion, , Intravenous, Continuous  sodium chloride flush 0.9 % injection 10 mL, 10 mL, Intravenous, 2 times per day  sodium chloride flush 0.9 % injection 10 mL, 10 mL, Intravenous, PRN  acetaminophen (TYLENOL) tablet 650 mg, 650 mg, Oral, Q4H PRN  ondansetron (ZOFRAN) injection 4 mg, 4 mg, Intravenous, Q6H PRN  ipratropium-albuterol (DUONEB) nebulizer solution 1 ampule, 1 ampule, Inhalation, Q4H WA  azithromycin (ZITHROMAX) 500 mg in D5W 250ml addavial, 500 mg, Intravenous, Q24H **AND** cefTRIAXone (ROCEPHIN) 1 g in dextrose 5 % 50 mL IVPB (vial-mate), 1 g, Intravenous, Q24H  insulin lispro (HUMALOG) injection vial 0-6 Units, 0-6 Units, Subcutaneous, TID WC  insulin lispro (HUMALOG) injection vial 0-3 Units, 0-3 Units, Subcutaneous, Nightly  glucose (GLUTOSE) 40 % oral gel 15 g, 15 g, Oral, PRN  dextrose 50 % solution 12.5 g, 12.5 g, Intravenous, PRN  glucagon (rDNA) injection 1 mg, 1 mg, Intramuscular, PRN  dextrose 5 % solution, 100 mL/hr, Intravenous, PRN    Allergies:  Ace inhibitors    Social History:    He stopped smoking in 1987, prior to that smoked 1 PPD for 50 years. There is no history of alcohol or illicit drug use.  He has a walker at home, and most of the 03/12/18   0654   03/12/18   2324  03/13/18   0800   WBC  8.2   --   7.3   --    --    --    HGB  11.9*   < >  10.7*   < >  10.2*  11.0*   HCT  37.1   < >  32.6*   < >  32.2*  34.6*   PLT  249   --   223   --    --    --     < > = values in this interval not displayed. BMP: Recent Labs      03/11/18   1113  03/12/18   0654   NA  140  145   K  4.5  4.0   CO2  33*  35*   BUN  39*  34*   CREATININE  1.4*  1.2   LABGLOM  48  57   CALCIUM  9.1  8.8     Mag: No results for input(s): MG in the last 72 hours. Phos: No results for input(s): PHOS in the last 72 hours. TSH: No results for input(s): TSH in the last 72 hours. HgA1c:   Lab Results   Component Value Date    LABA1C 7.8 (H) 03/12/2018     No results found for: EAG  proBNP:   Recent Labs      03/11/18   1113   PROBNP  3,391*     PT/INR: No results for input(s): PROTIME, INR in the last 72 hours. APTT:No results for input(s): APTT in the last 72 hours. CARDIAC ENZYMES:  Recent Labs      03/11/18   1113   TROPONINI  0.03     FASTING LIPID PANEL:  Lab Results   Component Value Date    CHOL 121 01/04/2018    HDL 49 01/04/2018    LDLCALC 58 01/04/2018    TRIG 71 01/04/2018     LIVER PROFILE:  Recent Labs      03/11/18   1113   AST  34   ALT  16   LABALBU  3.8     CT chest with contrast 3/11/2018: Impression:     Extensive pleural calcification involving bilateral hemidiaphragms and both lungs along the lateral walls, likely sequela of prior asbestos  exposure; the calcification is associated with areas of linear and irregular soft tissue thickening, progressed since prior exam. There  is bulky left hilar lymphadenopathy. The constellation of findings are concerning for malignancy, specifically mesothelioma. CXR 3/11/2018:  1. Multiple bilateral nodular abnormalities concerning for metastatic  disease and/or malignancy. Further evaluation with CT scan the chest  is recommended. Patient also has known bilateral calcified pleural  plaques.   2. Stable, enlarged cardiomediastinal silhouette. .  3. Vascular calcifications thoracic aorta. 4. Suspected bibasilar infiltrate/pneumonia and bilateral pleural  effusions. 5. Right azygos lobe. Assessment and Recommendations to follow by Dr. Kiarra Amaro     Electronically signed by FRANCESCA Sandoval on 3/13/2018 at 3:37 PM     I independently performed an evaluation on the patient. I have reviewed the above documentation completed by the advance practitioner. Please see my additional contributions to the HPI, physical exam, assessment and medical decision making. See accompanying documentation for full consult. ASSESSMENT AND PLAN:  1. Chronic Afib: CHADS VASc is 5 with age, CAD, CHF and DM and therefore strongly indicated for anticoagulation for stroke prophylaxis. That said anticoagulation is appropriately being held for hematuria and procedure tomorrow. If/when restarted the dosing of the Eliquis should be 5 mg BID based on his renal function and body weight. Rate control with BB.     2. Hematuria: Per urology. For cysto tomorrow. 3. CAD-CABG: On ASA/BB/statin/imdur. 4. 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 Toprol/ARB/imdur/lasix. Add aldactone. Stop IVF. 5. Lipids: Statin. 6. DM: Per primary service. 7. COPD: Per primary service. 8. Renal insuff: Follow labs. 9. Carotid disease: On Lipid and ASA. 10. Anemia: Follow labs. 11. Change of MS Angie Montero D.O.   Cardiologist  Cardiology, [facility]

## 2018-03-13 NOTE — PROGRESS NOTES
Spoke with clinical manager, patient cysto is scheduled for 1330 tomorrow.   Electronically signed by Wilman Stanley RN on 3/13/2018 at 6:43 PM

## 2018-03-13 NOTE — PROGRESS NOTES
included one of following options; 1 . Automated exposure control, 2. Adjustment of MA and or KV according to patient's size or 3. Use of iterative reconstruction. FINDINGS: There is moderate generalized cerebral atrophy with moderate periventricular and deep subcortical white matter low-attenuation indicating chronic microvascular ischemic disease. No intracerebral hemorrhage or extra-axial fluid collections are seen. There is no hydrocephalus. There is no mass effect, midline shift or sulcal effacement. The posterior fossa structures appear unremarkable for age. There is mild calcification of the carotid siphons and distal vertebral arteries. The bony calvarium appears intact. The there is mild hyperostosis frontalis interna. There is a small mucous retention cyst along the medial wall of the left maxillary sinus measuring approximately 14 mm x 12 mm. There is mucosal thickening in the anterior ethmoid air cells bilaterally, greater on the right and involving the right frontoethmoidal recess. The mastoid air cells cells are clear. CONCLUSION: 1. No acute intracranial abnormality or skull fracture seen. 2. Moderate generalized cerebral atrophy with moderate chronic microvascular ischemic changes involving the deep white matter zones. 3. Mild calcification of the carotid siphons and distal vertebral arteries. 4. Mild chronic paranasal sinus disease. Ct Chest W Contrast    Result Date: 3/11/2018  Patient MRN:  [id number] : 1930 Age: 80 years Gender: Male Order Date:  3/11/2018 12:30 PM EXAM: CT CHEST W CONTRAST NUMBER OF IMAGES:  [id number] INDICATION:  CHEST WALL PAIN COMPARISON: 2013  RESULT: Limitations:  Respiratory motion Lines, tubes, and devices:  None. Lung parenchyma and pleura: Limited evaluation due to respiratory motion. Trace right pleural effusion. No focal consolidation or pneumothorax. There is an azygos lobe and fissure.  There are extensive pleural calcifications involving the right and left hemidiaphragm bases with also involvement of the lateral walls of both lungs; the pleural calcification is associated with soft tissue thickening concerning for malignancy. Central airways are patent. Thoracic inlet, heart, and mediastinum: Heart size is enlarged. There is a filling defect in the superior vena cava which is thought to be a mixing artifact from blood from the left BC vein rather than an occlusion. Main pulmonary artery appears patent. There is bulky left hilar lymphadenopathy. Mediastinal lymphadenopathy is also noted. Bones and soft tissues: Degenerative changes in the thoracolumbar spine. Status post a median sternotomy. Soft tissues are unremarkable. Upper abdomen:  Unremarkable except for colonic diverticulosis      Extensive pleural calcification involving bilateral hemidiaphragms and both lungs along the lateral walls, likely sequela of prior asbestos exposure; the calcification is associated with areas of linear and irregular soft tissue thickening, progressed since prior exam. There is bulky left hilar lymphadenopathy. The constellation of findings are concerning for malignancy, specifically mesothelioma. Additional findings as detailed. Xr Chest Portable    Result Date: 3/11/2018  Patient MRN: [id number] : 1930 Age:  80 years Gender: Male Order Date: 3/11/2018 11:00 AM Exam: XR CHEST PORTABLE Number of Views: 2 Indication:  Weakness Comparison: Chest radiographs 1/3/2018. Findings: Stable, enlarged cardiomediastinal silhouette. Multifocal nodular abnormalities throughout the lungs bilaterally. Vascular calcifications thoracic aorta. Bibasilar airspace disease and bilateral pleural effusions. Bilateral calcified pleural plaques. Right azygos lobe. ALERT:  THIS IS AN ABNORMAL REPORT. Findings communicated directly with Dr. Dennie Manner at approximately 3/11/2018 12:11 PM . 1. Multiple bilateral nodular abnormalities concerning for metastatic disease and/or malignancy.  Further evaluation with CT scan the chest is recommended. Patient also has known bilateral calcified pleural plaques. 2. Stable, enlarged cardiomediastinal silhouette. . 3. Vascular calcifications thoracic aorta. 4. Suspected bibasilar infiltrate/pneumonia and bilateral pleural effusions. 5. Right azygos lobe. Us Retroperitoneal Complete    Result Date: 3/12/2018  Patient MRN:  [id number] : 1930 Age: 80 years Gender: Male Order Date:  3/12/2018 10:45 AM EXAM: US RETROPERITONEAL COMPLETE NUMBER OF IMAGES:  [id number] INDICATION:  HEMATURIA, GROSS  Technique: Multiplanar grayscale ultrasonographic images were obtained of the kidneys by the ultrasound technologist on 3/12/2018 10:45 AM. Adjacent structures are also visualized. Selected static images were presented to the radiologist for review. Comparison: No prior studies available for comparison. Findings: The kidneys demonstrate normal smooth contour and echotexture. The right kidney measures approximately 12.0 cm x 5.0 cm x 5.3 cm and the left kidney measures approximately 11.5 cm x 6.3 cm x 6.2 cm. There is a minimal amount of free fluid about the right kidney. No hydronephrosis, renal calculi or solid renal masses are demonstrated. There is a well-circumscribed anechoic nodule arising from the left kidney which measures 0.8 cm x 0.7 cm x 0.7 cm in size. A Pederson catheter is seen within the urinary bladder. 1. No evidence of hydronephrosis, renal calculi or solid renal masses. 2. Trace amount of right perinephric free fluid of uncertain etiology and clinical significance. Recommend clinical correlation. 3. Simple left renal cyst, as above.        Scheduled Meds:   formoterol  20 mcg Nebulization BID    budesonide  500 mcg Nebulization BID    spironolactone  25 mg Oral Daily    ketamine  50 mg Intramuscular Once    ketamine  25 mg Intramuscular Once    ketamine  25 mg Intravenous Once    sodium chloride flush  10 mL Intravenous 2 times per day    aspirin  81 mg Eliquis             BS controlled             H&H stable             Urology intervention when condition permits             Lengthy d/w with family at the bedside             US negative for urinary obstruction        Darylene Barges, DO  8:16 PM  3/13/2018

## 2018-03-14 NOTE — PROGRESS NOTES
Dr. Alejandra Nagy notified via [id number] of Dr. Garcia Flow decision not to follow through with cysto due pt not being stable enough / Jorge Calle 3/14/18 7:44 AM

## 2018-03-14 NOTE — PROGRESS NOTES
g Oral PRN Jori P Popovec, DO        dextrose 50 % solution 12.5 g  12.5 g Intravenous PRN Jori P Popovec, DO   12.5 g at 03/14/18 0801    glucagon (rDNA) injection 1 mg  1 mg Intramuscular PRN Jori P Popovec, DO        dextrose 5 % solution  100 mL/hr Intravenous PRN Jori P Popovec, DO         PHYSICAL EXAM:  General Appearance:    Lying in bed in no acute distress. Appears comfortable. Head:  Normocephalic atraumatic without obvious abnormality   Throat:  Lips, mucosa, and tongue normal.   Neck:  Supple, symmetrical, trachea midline, no adenopathy;     thyroid:  no enlargement/tenderness/nodules or JVD. Lungs:   Breath sounds diminished no active wheeze. Respirations   unlabored. Symmetrical expansion. Heart:   irregular rate and rhythm, S1 and S2 normal, + murmur,  No rub   or gallop. Abdomen:    Soft, non-tender, bowel sounds active all four quadrants,     no masses, no organomegaly, no bruit. Extremities  Extremities normal, no cyanosis, clubbing, or edema. Capillary refill <3 seconds. Skin:   Warm and dry. Skin color, texture, turgor normal. No rashes,    bleeding,  or lesions. See wound care assessment. Pederson with hematuria            ASSESSMENT:  1. Suspect mesothelioma in the setting of asbestos exposure - concerning for possible metastasis  2. Acute on chronic COPD with hypoxia - requiring increased nasal cannula oxygen  3. Acute on chronic CHF - pro BNP 70 elevated from previous in the setting of slightly diminished renal function  4. History of atrial fibrillation - on apixaban chronically, currently held  5. Doubt pneumonia  6. Encephalopathy - concern for possible metastatic disease not seen on CT scan  7. Hematuria with a history of bladder carcinoma- urology following.  Renal ultrasound planned and possible cystoscopy  Principal Problem:    Altered mental status  Active Problems:    Coronary artery disease involving native coronary

## 2018-03-14 NOTE — PROGRESS NOTES
included one of following options; 1 . Automated exposure control, 2. Adjustment of MA and or KV according to patient's size or 3. Use of iterative reconstruction. FINDINGS: There is moderate generalized cerebral atrophy with moderate periventricular and deep subcortical white matter low-attenuation indicating chronic microvascular ischemic disease. No intracerebral hemorrhage or extra-axial fluid collections are seen. There is no hydrocephalus. There is no mass effect, midline shift or sulcal effacement. The posterior fossa structures appear unremarkable for age. There is mild calcification of the carotid siphons and distal vertebral arteries. The bony calvarium appears intact. The there is mild hyperostosis frontalis interna. There is a small mucous retention cyst along the medial wall of the left maxillary sinus measuring approximately 14 mm x 12 mm. There is mucosal thickening in the anterior ethmoid air cells bilaterally, greater on the right and involving the right frontoethmoidal recess. The mastoid air cells cells are clear. CONCLUSION: 1. No acute intracranial abnormality or skull fracture seen. 2. Moderate generalized cerebral atrophy with moderate chronic microvascular ischemic changes involving the deep white matter zones. 3. Mild calcification of the carotid siphons and distal vertebral arteries. 4. Mild chronic paranasal sinus disease. Ct Chest W Contrast    Result Date: 3/11/2018  Patient MRN:  [id number] : 1930 Age: 80 years Gender: Male Order Date:  3/11/2018 12:30 PM EXAM: CT CHEST W CONTRAST NUMBER OF IMAGES:  347 INDICATION:  CHEST WALL PAIN COMPARISON: 2013  RESULT: Limitations:  Respiratory motion Lines, tubes, and devices:  None. Lung parenchyma and pleura: Limited evaluation due to respiratory motion. Trace right pleural effusion. No focal consolidation or pneumothorax. There is an azygos lobe and fissure.  There are extensive pleural calcifications involving the right and left hemidiaphragm bases with also involvement of the lateral walls of both lungs; the pleural calcification is associated with soft tissue thickening concerning for malignancy. Central airways are patent. Thoracic inlet, heart, and mediastinum: Heart size is enlarged. There is a filling defect in the superior vena cava which is thought to be a mixing artifact from blood from the left BC vein rather than an occlusion. Main pulmonary artery appears patent. There is bulky left hilar lymphadenopathy. Mediastinal lymphadenopathy is also noted. Bones and soft tissues: Degenerative changes in the thoracolumbar spine. Status post a median sternotomy. Soft tissues are unremarkable. Upper abdomen:  Unremarkable except for colonic diverticulosis      Extensive pleural calcification involving bilateral hemidiaphragms and both lungs along the lateral walls, likely sequela of prior asbestos exposure; the calcification is associated with areas of linear and irregular soft tissue thickening, progressed since prior exam. There is bulky left hilar lymphadenopathy. The constellation of findings are concerning for malignancy, specifically mesothelioma. Additional findings as detailed. Xr Chest Portable    Result Date: 3/11/2018  Patient MRN: [id number] : 1930 Age:  80 years Gender: Male Order Date: 3/11/2018 11:00 AM Exam: XR CHEST PORTABLE Number of Views: 2 Indication:  Weakness Comparison: Chest radiographs 1/3/2018. Findings: Stable, enlarged cardiomediastinal silhouette. Multifocal nodular abnormalities throughout the lungs bilaterally. Vascular calcifications thoracic aorta. Bibasilar airspace disease and bilateral pleural effusions. Bilateral calcified pleural plaques. Right azygos lobe. ALERT:  THIS IS AN ABNORMAL REPORT. Findings communicated directly with Dr. Casey Sánchez at approximately 3/11/2018 12:11 PM . 1. Multiple bilateral nodular abnormalities concerning for metastatic disease and/or malignancy.  Further Oral Daily    atorvastatin  40 mg Oral Daily    furosemide  40 mg Oral BID    insulin lispro protamine & lispro  40 Units Subcutaneous BID     isosorbide mononitrate  120 mg Oral Daily    levothyroxine  175 mcg Oral Daily    losartan  12.5 mg Oral Daily    metoprolol succinate  50 mg Oral Daily    sodium chloride flush  10 mL Intravenous 2 times per day    ipratropium-albuterol  1 ampule Inhalation Q4H WA    azithromycin  500 mg Intravenous Q24H    And    cefTRIAXone (ROCEPHIN) IV  1 g Intravenous Q24H    insulin lispro  0-6 Units Subcutaneous TID     insulin lispro  0-3 Units Subcutaneous Nightly     Continuous Infusions:   sodium chloride 75 mL/hr at 03/13/18 1925    dextrose       PRN Meds:.ipratropium-albuterol, opium-belladonna, sodium chloride flush, acetaminophen, sodium chloride flush, acetaminophen, ondansetron, glucose, dextrose, glucagon (rDNA), dextrose    I/O last 3 completed shifts: In: 6437 [I.V.:3399]  Out: 1275 [Urine:1275]  No intake/output data recorded.     Intake/Output Summary (Last 24 hours) at 03/14/18 0655  Last data filed at 03/13/18 2259   Gross per 24 hour   Intake             3399 ml   Output              850 ml   Net             2549 ml       Assessment:    Active Hospital Problems    Diagnosis    Sepsis ([facility] [A41.9]    Altered mental status [R41.82]    Chronic anticoagulation [Z79.01]    Atrial fibrillation ([facility] [I48.91]    CHF (congestive heart failure), NYHA class III, acute on chronic, combined ([facility] [I50.43]    Acute delirium [R41.0]    Nonrheumatic aortic valve stenosis [I35.0]    Hypertension [I10]    Hyperlipidemia [E78.5]    Diabetes mellitus ([facility] [R41.7]    Diastolic CHF, chronic (HCC) [I50.32]    COPD with hypoxia (HCC) [J44.9, R09.02]    Coronary artery disease involving native coronary artery of native heart without angina pectoris [I25.10]       Plan:  Rapid AF this AM             Consultation notes reviewed             Hold Devin

## 2018-03-14 NOTE — PROGRESS NOTES
[facility] PROGRESS NOTE                                                                       3/14/2018        CHIEF UROLOGIC COMPLAINT: gross hematuria, bladder cancer   HISTORY OF PRESENT ILLNESS:  Patient without new complaints. Confusion. Family at side. Attempted x 2 for surgery today ( prior to and after Cardiology evaluation ). Procedure cancelled today due to cardiopulmonary reasons per Anesthesia. Pederson with hematuria but draining well. REVIEW OF SYSTEMS:   CONSTITUTIONAL: weakness  HEENT: negative  HEMATOLOGIC: negative  ENDOCRINE: negative  RESPIRATORY: SOB  CV: tachycardia  GI: negative  NEURO: negative  ORTHOPEDICS: negative  PSYCHIATRIC: confusion  : as above    PAST FAMILY HISTORY:    Family History   Problem Relation Age of Onset    Arthritis Sister 66     PAST SOCIAL HISTORY:    Social History     Social History    Marital status:       Spouse name: N/A    Number of children: N/A    Years of education: N/A     Occupational History    retired- [employer]      Social History Main Topics    Smoking status: Former Smoker     Packs/day: 1.00     Years: 50.00     Types: Cigarettes     Quit date: 11/28/1997    Smokeless tobacco: Never Used    Alcohol use Yes      Comment: RARELY    Drug use: No    Sexual activity: Not Asked     Other Topics Concern    None     Social History Narrative    None       Scheduled Meds:   formoterol  20 mcg Nebulization BID    budesonide  500 mcg Nebulization BID    spironolactone  25 mg Oral Daily    ketamine  50 mg Intramuscular Once    ketamine  25 mg Intramuscular Once    ketamine  25 mg Intravenous Once    sodium chloride flush  10 mL Intravenous 2 times per day    aspirin  81 mg Oral Daily    atorvastatin  40 mg Oral Daily    furosemide  40 mg Oral BID    insulin lispro protamine & lispro  40 Units

## 2018-03-14 NOTE — PROGRESS NOTES
03/12/18 0555    opium-belladonna (B&O SUPPRETTES) 16.2-60 MG suppository 60 mg  60 mg Rectal Q8H PRN Ruby Khanna CNP        ketamine (KETALAR) injection 50 mg  50 mg Intramuscular Once Tammy Reed, DO        ketamine (KETALAR) injection 25 mg  25 mg Intramuscular Once Tammy Reed, DO        ketamine (KETALAR) injection 25 mg  25 mg Intravenous Once Tammy Reed, DO        sodium chloride flush 0.9 % injection 10 mL  10 mL Intravenous 2 times per day Татьяна Jakob, DO   10 mL at 03/12/18 0923    sodium chloride flush 0.9 % injection 10 mL  10 mL Intravenous PRN [employer], DO        acetaminophen (TYLENOL) tablet 650 mg  650 mg Oral Q4H PRN Tammy Reed, DO        aspirin EC tablet 81 mg  81 mg Oral Daily Jori Mehta, DO   81 mg at 03/14/18 0943    atorvastatin (LIPITOR) tablet 40 mg  40 mg Oral Daily Jori Chicasc, DO   40 mg at 03/14/18 0943    furosemide (LASIX) tablet 40 mg  40 mg Oral BID Jori RUTH Popovec, DO   40 mg at 03/14/18 0943    insulin lispro protamine & lispro (HUMALOG MIX) (75-25) 100 UNIT per ML injection vial SUSP 40 Units  40 Units Subcutaneous BID  Jori RUTH Popovec, DO   Stopped at 03/14/18 0942    isosorbide mononitrate (IMDUR) extended release tablet 120 mg  120 mg Oral Daily Jori Waiteovec, DO   120 mg at 03/14/18 0944    levothyroxine (SYNTHROID) tablet 175 mcg  175 mcg Oral Daily Jori Waiteovec, DO   175 mcg at 03/14/18 0449    losartan (COZAAR) tablet 12.5 mg  12.5 mg Oral Daily Jori RUTH Popovec, DO   12.5 mg at 03/14/18 0943    metoprolol succinate (TOPROL XL) extended release tablet 50 mg  50 mg Oral Daily Jori Waiteovec, DO   50 mg at 03/14/18 0449    0.9 % sodium chloride infusion   Intravenous Continuous Jori Chicasc DO 75 mL/hr at 03/14/18 0800      sodium chloride flush 0.9 % injection 10 mL  10 mL Intravenous 2 times per day Jori Chicasc DO   10 mL at 03/12/18 0923    sodium chloride flush 0.9 % injection 10 mL  10 mL Intravenous PRN Jori Mehta DO        intra-op    Discussed with Dr Essence Eli and family at [address] [facility], [address] [place] [address] Cardiology        Lab Results   Component Value Date     03/12/2018     03/11/2018     02/08/2018    K 4.0 03/12/2018    K 4.5 03/11/2018    K 5.0 02/08/2018     03/12/2018    CL 94 (L) 03/11/2018    CL 94 (L) 02/08/2018    CO2 35 (H) 03/12/2018    CO2 33 (H) 03/11/2018    CO2 30 (H) 02/08/2018    BUN 34 (H) 03/12/2018    BUN 39 (H) 03/11/2018    BUN 43 (H) 02/08/2018    CREATININE 1.2 03/12/2018    CREATININE 1.4 (H) 03/11/2018    CREATININE 1.8 (H) 02/08/2018    GLUCOSE 178 (H) 03/12/2018    GLUCOSE 179 (H) 03/11/2018    GLUCOSE 166 (H) 02/08/2018    CALCIUM 8.8 03/12/2018    CALCIUM 9.1 03/11/2018    CALCIUM 9.1 02/08/2018

## 2018-03-14 NOTE — PROGRESS NOTES
Pt arrived to pre op with a heart rate ranging from 115-135 BPM. He is currently not receiving a cardizem drip. I feel it would be best to start this drip on the floor and establish appropriate rate control prior to initiating an anesthetic.

## 2018-03-14 NOTE — CONSULTS
Consults  Pt examined and chart reviewed   He has multifactorial delirium.   We need the bladder problem evaluated and treated before he will get better

## 2018-03-15 NOTE — CARE COORDINATION
Social work / Discharge Planning:       Social work met with patient's daughter Armani Leonard at bedside for initial assessment. The patient lives alone in a one story home using a FWW and WC. He has home 02 from [facility].   Patient was active with [facility] at the time of his hospitalization and has no history of ANTONIET[facility]. He uses [facility] in [facility]. He sees Dr. Farida Gonzalez and physicians at the [facility]. AM PAC is 14/24. Family is not at all willing to discuss discharge planning at this time. Per daughter, Mati Franc are starting over at day [facility] today. No one knows what's going on\". Social work will follow and assist with discharge planning.   Electronically signed by ABRAM Forte on 3/15/2018 at 8:40 AM

## 2018-03-15 NOTE — PROGRESS NOTES
Chief Complaint:  Change in mental status    Subjective: The patient is awake. Still confused  Denies chest pain & SOB . Denies abdominal pain. Tolerating diet. No nausea or vomiting. Family at the bedside. Objective:    Vitals:    03/15/18 0905   BP: 135/80   Pulse: 122   Resp:    Temp:    SpO2:      Alert, confused and agitated   Heart:  irrg, no murmurs, gallops, or rubs. Lungs:  diminished bilaterally, no wheeze, rales or rhonchi  Abd: bowel sounds present, nontender, nondistended, no masses  Extrem:  No clubbing, cyanosis, or edema  Tele: AF/rapid  Urine still bloody    Lab Results   Component Value Date     2018    K 4.0 2018     2018    CO2 35 2018    BUN 34 2018    CREATININE 1.2 2018    CALCIUM 8.8 2018      Lab Results   Component Value Date    WBC 7.3 2018    RBC 3.44 2018    HGB 11.1 03/15/2018    HCT 34.7 03/15/2018    MCV 94.8 2018    MCH 31.1 2018    MCHC 32.8 2018    RDW 13.8 2018     2018    MPV 9.6 2018     PT/INR:    Lab Results   Component Value Date    PROTIME 12.0 2017    INR 1.1 2017     No results for input(s): POCGLU in the last 72 hours. No results for input(s): NA, K, CL, CO2, BUN, LABALBU, CREATININE, CALCIUM, GFRAA, LABGLOM, GLUCOSE in the last 72 hours. Invalid input(s): GLU    Ct Head Wo Contrast    Result Date: 3/11/2018  Patient MRN:  [id number] : 1930 Age: 80 years Gender: Male Order Date:  3/11/2018 6:15 PM EXAM: CT HEAD WO CONTRAST NUMBER OF IMAGES:  167 INDICATION:  HEAD TRAUMA, CLOSED, MOD-SEVERE  COMPARISON: None TECHNIQUE: CT scan of the head was performed from the vertex through the posterior fossa. No  IV contrast was administered. Coronal and sagittal reconstructions were also reviewed. Low-dose CT  acquisition technique included one of following options; 1 . Automated exposure control, 2.  Adjustment of MA and or KV according to patient's size or 3. Use of iterative reconstruction. FINDINGS: There is moderate generalized cerebral atrophy with moderate periventricular and deep subcortical white matter low-attenuation indicating chronic microvascular ischemic disease. No intracerebral hemorrhage or extra-axial fluid collections are seen. There is no hydrocephalus. There is no mass effect, midline shift or sulcal effacement. The posterior fossa structures appear unremarkable for age. There is mild calcification of the carotid siphons and distal vertebral arteries. The bony calvarium appears intact. The there is mild hyperostosis frontalis interna. There is a small mucous retention cyst along the medial wall of the left maxillary sinus measuring approximately 14 mm x 12 mm. There is mucosal thickening in the anterior ethmoid air cells bilaterally, greater on the right and involving the right frontoethmoidal recess. The mastoid air cells cells are clear. CONCLUSION: 1. No acute intracranial abnormality or skull fracture seen. 2. Moderate generalized cerebral atrophy with moderate chronic microvascular ischemic changes involving the deep white matter zones. 3. Mild calcification of the carotid siphons and distal vertebral arteries. 4. Mild chronic paranasal sinus disease. Ct Chest W Contrast    Result Date: 3/11/2018  Patient MRN:  [id number] : 1930 Age: 80 years Gender: Male Order Date:  3/11/2018 12:30 PM EXAM: CT CHEST W CONTRAST NUMBER OF IMAGES:  347 INDICATION:  CHEST WALL PAIN COMPARISON: 2013  RESULT: Limitations:  Respiratory motion Lines, tubes, and devices:  None. Lung parenchyma and pleura: Limited evaluation due to respiratory motion. Trace right pleural effusion. No focal consolidation or pneumothorax. There is an azygos lobe and fissure.  There are extensive pleural calcifications involving the right and left hemidiaphragm bases with also involvement of the lateral walls of both lungs; the pleural calcification

## 2018-03-15 NOTE — PROGRESS NOTES
INPATIENT CARDIOLOGY FOLLOW-UP    Name: Renu Wills    Age: 80 y.o. Date of Admission: 3/11/2018 10:34 AM    Date of Service: 3/15/2018    Chief Complaint: Follow-up for atrial fibrillation    Interim History:  Confused on CPAP Lethargic Currently with no complaints of CP, SOB, palpitations, dizziness, or lightheadedness. Review of Systems:   Cardiac: As per HPI  General: No fever, chills  Pulmonary: As per HPI  HEENT: No visual disturbances, difficult swallowing  GI: No nausea, vomiting  Endocrine: No thyroid disease or DM  Musculoskeletal: MANLEY x 4, no focal motor deficits  Skin: Intact, no rashes  Neuro/Psych: No headache or seizures    Problem List:  Principal Problem:    Altered mental status  Active Problems:    Coronary artery disease involving native coronary artery of native heart without angina pectoris    Diastolic CHF, chronic ([facility])    COPD with hypoxia ([facility])    Diabetes mellitus ([facility]    Hypertension    Hyperlipidemia    Nonrheumatic aortic valve stenosis    Acute delirium    CHF (congestive heart failure), NYHA class III, acute on chronic, combined ([facility])    Atrial fibrillation ([facility])    Chronic anticoagulation    Sepsis ([facility]  Resolved Problems:    * No resolved hospital problems. *      Allergies:   Allergies   Allergen Reactions    Ace Inhibitors      Cough         Current Medications:  Current Facility-Administered Medications   Medication Dose Route Frequency Provider Last Rate Last Dose    QUEtiapine (SEROQUEL) tablet 25 mg  25 mg Oral BID Jori Mehta DO        formoterol (PERFOROMIST) nebulizer solution 20 mcg  20 mcg Nebulization BID Hoa Milton MD   20 mcg at 03/15/18 0958    budesonide (PULMICORT) nebulizer suspension 500 mcg  500 mcg Nebulization BID Hoa Milton MD   500 mcg at 03/15/18 7346    spironolactone (ALDACTONE) tablet 25 mg  25 mg Oral Daily Tez Thomas DO   25 mg at 03/15/18 0906    ipratropium-albuterol (DUONEB) nebulizer solution 1 ampule

## 2018-03-15 NOTE — PROGRESS NOTES
Popovec, DO   3 Units at 03/15/18 0907    insulin lispro (HUMALOG) injection vial 0-3 Units  0-3 Units Subcutaneous Nightly Jori RUTH Popovec, DO   1 Units at 03/14/18 2059    glucose (GLUTOSE) 40 % oral gel 15 g  15 g Oral PRN Jori RUTH Popovec, DO        dextrose 50 % solution 12.5 g  12.5 g Intravenous PRN Jori RUTH Popovec, DO   12.5 g at 03/14/18 0801    glucagon (rDNA) injection 1 mg  1 mg Intramuscular PRN Jori RUTH Popovec, DO        dextrose 5 % solution  100 mL/hr Intravenous PRN Jori RUTH Popovec, DO         PHYSICAL EXAM:  General Appearance:    Lying in bed in no acute distress. Appears comfortable. Head:  Normocephalic atraumatic without obvious abnormality   Throat:  Lips, mucosa, and tongue normal.   Neck:  Supple, symmetrical, trachea midline, no adenopathy;     thyroid:  no enlargement/tenderness/nodules or JVD. Lungs:   Breath sounds coarse wheezes throughout. Respirations   unlabored. Symmetrical expansion. Heart:   irregular rate and rhythm, S1 and S2 normal, + murmur,  No rub   or gallop. Abdomen:    Soft, non-tender, bowel sounds active all four quadrants,     no masses, no organomegaly, no bruit. Extremities  Extremities normal, no cyanosis, clubbing, or edema. Capillary refill <3 seconds. Skin:   Warm and dry. Skin color, texture, turgor normal. No rashes,    bleeding,  or lesions. See wound care assessment. Pederson with hematuria            ASSESSMENT:  1. Suspect mesothelioma in the setting of asbestos exposure - concerning for possible metastasis  2. Acute on chronic COPD with hypoxia - requiring increased nasal cannula oxygen  3. Acute on chronic CHF - pro BNP 70 elevated from previous in the setting of slightly diminished renal function  4. History of atrial fibrillation - on apixaban chronically, currently held  5. Doubt pneumonia  6. Encephalopathy - concern for possible metastatic disease not seen on CT scan  7.  Hematuria with a history of bladder carcinoma- urology following. Renal ultrasound planned and possible cystoscopy  Principal Problem:    Altered mental status  Active Problems:    Coronary artery disease involving native coronary artery of native heart without angina pectoris    Diastolic CHF, chronic (HCC)    COPD with hypoxia (HCC)    Diabetes mellitus ([facility]    Hypertension    Hyperlipidemia    Nonrheumatic aortic valve stenosis    Acute delirium    CHF (congestive heart failure), NYHA class III, acute on chronic, combined (HCC)    Atrial fibrillation (HCC)    Chronic anticoagulation    Sepsis ([facility]  Resolved Problems:    * No resolved hospital problems. 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 Monitor lytes, renal function  Scheduled bronchodilators- Perforomist, Budesonide, Duonebs. Resume Symbicort and spiriva at discharge  Antibiotics Rocephin, Zithromax -Follow cultures. Procalcitonin 0.11  Follow Hgb  Urology following -cysto to be rescheduled   Eliquis on hold  Consider palliative care consult for goals of care, address code status  Recommend swallow study  Recommend Bipap therapy for elevated CO2 and confusion as tolerated    This plan of care was reviewed in collaboration with Dr. Manuel Frey , RN, MSN, NP-C  3/15/2018  9:40 AM     I personally saw, examined, and cared for the patient. Labs, medications, radiographs reviewed. I agree with history exam and plans detailed in NP note. Palliative care consult   Intermittent bipap   Wean O2 as tolerated   Aysha Saleem M.D.    Pulmonary/Critical Care Medicine

## 2018-03-15 NOTE — PROGRESS NOTES
Occupational Therapy  OCCUPATIONAL THERAPY DAILY NOTE    Date:3/15/2018  Patient Name: Bronson Tran  MRN: [id number]  : 1930  Room: [address] [place] [address]-A     Patient Active Problem List   Diagnosis    Coronary artery disease involving native coronary artery of native heart without angina pectoris    Cancer ([facility]    COPD, moderate ([address]    Dyspnea    Diastolic CHF, chronic ([address]    COPD with hypoxia ([address]    Atrial fibrillation with RVR ([address]    Diabetes mellitus ([address]    Hypertension    Hyperlipidemia    Thyroid disease    Nonrheumatic aortic valve stenosis    Acute delirium    Acute on chronic combined systolic and diastolic congestive heart failure (HCC)    CHF (congestive heart failure), NYHA class III, acute on chronic, combined ([address]    Atrial fibrillation (HCC)    Chronic anticoagulation    Congestive heart failure (HCC)    Altered mental status    Sepsis ([address]       Subjective:  Pt laying in the bed. SOB. Precautions: falls, O2  Chart Reviewed:  Yes          Independent Supervision Contact Guard Assist Minimal Assist Moderate Assist Maximum Assist Dependent   Feeding          Grooming          UE  Bathing          LE Bathing          UE Dressing          LE  Dressing                x    Toileting                x   Comments:  Poor tolerance for ADL activity. Dependent for toilet hygiene with slight incontinence of bowel. Functional Transfers:  Supine to sit onto the side of the bed max A. Sit balance on the EOB min A. Tolerated sitting 7 minutes prior to complaint of fatigue. Dependent x2 return to supine. Rolling side to side for positioning and ADL min A. Therapeutic Exercises:  Fair use of UE's for support while seated on the side of the bed. Other:  Pt with SOB however O2 saturation 95% resting. Saturation while seated on the EOB 93%. Decreased after rolling side to side in the bed to 79-80%. Time taken to recover however remained in the low 80's.  Nursing called

## 2018-03-15 NOTE — PROGRESS NOTES
[facility] PROGRESS NOTE                                                                       3/15/2018        CHIEF UROLOGIC COMPLAINT: Gross hematuria in the setting of history of bladder cancer  HISTORY OF PRESENT ILLNESS:  Patient without new complaints. Urine is jp without any clots. Patient's case was canceled due to concern over his cardiopulmonary status patient's daughter at bedside very anxious about the overall status of her father    REVIEW OF SYSTEMS:   CONSTITUTIONAL: negative  HEENT: negative  HEMATOLOGIC: negative  ENDOCRINE: negative  RESPIRATORY: negative  CV: negative  GI: negative  NEURO: negative  ORTHOPEDICS: negative  PSYCHIATRIC: negative  : as above    PAST FAMILY HISTORY:    Family History   Problem Relation Age of Onset    Arthritis Sister 66     PAST SOCIAL HISTORY:    Social History     Social History    Marital status:       Spouse name: N/A    Number of children: N/A    Years of education: N/A     Occupational History    retired- [employer]      Social History Main Topics    Smoking status: Former Smoker     Packs/day: 1.00     Years: 50.00     Types: Cigarettes     Quit date: 11/28/1997    Smokeless tobacco: Never Used    Alcohol use Yes      Comment: RARELY    Drug use: No    Sexual activity: Not Asked     Other Topics Concern    None     Social History Narrative    None       Scheduled Meds:   formoterol  20 mcg Nebulization BID    budesonide  500 mcg Nebulization BID    spironolactone  25 mg Oral Daily    ketamine  50 mg Intramuscular Once    ketamine  25 mg Intramuscular Once    ketamine  25 mg Intravenous Once    sodium chloride flush  10 mL Intravenous 2 times per day    aspirin  81 mg Oral Daily    atorvastatin  40 mg Oral Daily    furosemide  40 mg Oral BID    insulin lispro protamine & lispro  40 Units Subcutaneous BID     isosorbide mononitrate  120 mg Oral Daily    levothyroxine  175 mcg Oral Daily    losartan  12.5 mg Oral Daily    metoprolol succinate  50 mg Oral Daily    sodium chloride flush  10 mL Intravenous 2 times per day    ipratropium-albuterol  1 ampule Inhalation Q4H WA    azithromycin  500 mg Intravenous Q24H    And    cefTRIAXone (ROCEPHIN) IV  1 g Intravenous Q24H    insulin lispro  0-6 Units Subcutaneous TID WC    insulin lispro  0-3 Units Subcutaneous Nightly     Continuous Infusions:   sodium chloride 75 mL/hr at 03/14/18 2326    dextrose       PRN Meds:.ipratropium-albuterol, opium-belladonna, sodium chloride flush, acetaminophen, sodium chloride flush, acetaminophen, ondansetron, glucose, dextrose, glucagon (rDNA), dextrose    /71   Pulse 110   Temp 98.4 °F (36.9 °C) (Oral)   Resp 18   Ht 6' (1.829 m)   Wt 236 lb 8 oz (107.3 kg)   SpO2 92%   BMI 32.08 kg/m²     Lab Results   Component Value Date    WBC 7.3 03/12/2018    HGB 11.1 (L) 03/15/2018    HCT 34.7 (L) 03/15/2018    MCV 94.8 03/12/2018     03/12/2018       Lab Results   Component Value Date    CREATININE 1.2 03/12/2018     Specimen Source: [facility] URINE    Clinical Data:  Abnormal bleeding, bladder cancer, AMS, Pneumonia, skin  CA            ADEQUACY STATEMENT:  Specimen is satisfactory for interpretation    DIAGNOSIS  POSITIVE FOR MALIGNANT CELLS    High grade urothelial carcinoma    COMMENT:  Intradepartmental consultation obtained. PHYSICAL EXAMINATION:  Skin dry, without rashes  Respirations non-labored, intact  Abdomen soft, non-tender, non-distended  Alert but confused  Pederson draining jp urine      ASSESSMENT AND PLAN:  1.  Gross hematuria in the setting of anticoagulation with a history of previous bladder cancer with noncompliance with follow-up for 4 years.  -Patient's cystoscopy was canceled twice yesterday due to concern over his cardiopulmonary status  -Procedure is on hold until he can be medically cleared and his case approved by anesthesia  -Continue his catheter and irrigation as needed and continue to hold his anticoagulation.  -I long discussion with the patient's daughter today she does not recall him ever being diagnosed with bladder cancer. Discussed the details of 2014 diagnosis and no follow-up. His urine cytology is positive and it is concerning that he does have a recurrent bladder mass. He was unable to have a CT scan due to his confusion and inability to lie still. If his clinical status improves he should undergo full metastatic evaluation.     The family is deciding whether or not they want him to undergo cystoscopy but I would continue to obtain full medical clearance for him to have anesthesia and possible procedure    We will continue to follow      Electronically signed by Mckay Sher MD on 3/15/2018 at 6:57 AM

## 2018-03-16 NOTE — PROGRESS NOTES
below have been reviewed prior to initiation of this evaluation. ADMITTING DIAGNOSIS: Altered mental status [R41.82]  Altered mental status [R41.82]     ACTIVE PROBLEM LIST:   Patient Active Problem List   Diagnosis    Coronary artery disease involving native coronary artery of native heart without angina pectoris    Cancer ([address]    COPD, moderate ([address]    Dyspnea    Diastolic CHF, chronic ([address]    COPD with hypoxia ([address]    Atrial fibrillation with RVR ([address]    Diabetes mellitus ([address]    Hypertension    Hyperlipidemia    Thyroid disease    Nonrheumatic aortic valve stenosis    Acute delirium    Acute on chronic combined systolic and diastolic congestive heart failure ([address]    CHF (congestive heart failure), NYHA class III, acute on chronic, combined ([address]    Atrial fibrillation (HCC)    Chronic anticoagulation    Congestive heart failure ([address]    Altered mental status    Sepsis ([address]     Brooklynn SHAIKH CCC/SLP

## 2018-03-16 NOTE — PROGRESS NOTES
Daily Pulmonary Progress Note    Patient being followed for COPD, acute on chronic respiratory failure with hypoxia, pneumonia     Subjective:  Perla Davidson is a 80 y.o.  male lying in bed in some apparent distress. Tolerating current oxygen therapy 6 liters HF NC. Appears more lethargic. Received Seroquel and Ativan last HS. Did not use bipap since yesterday. Weak moist cough, difficulty expectorating. Audible wheeze noted. Recent bronchodilator treatment given. Family at bedside. Questions answered. Remains in sinus tachycardia. No fever documented in past 24 hrs. . WBC WNL. Remains on antibiotics for CAP coverage. ROS:  Unable to obtain      Objective:  Vitals: /68   Pulse 115   Temp 97.6 °F (36.4 °C) (Axillary)   Resp 24   Ht 6' (1.829 m)   Wt 238 lb 6.4 oz (108.1 kg)   SpO2 98%   BMI 32.33 kg/m²      I/O last 3 completed shifts: In: 1702.3 [P.O.:360; I.V.:1292.3; IV Piggyback:50]  Out: 0413 [Urine:3020]    Daily Lab Values:   CBC:   Lab Results   Component Value Date    WBC 7.3 03/12/2018    RBC 3.44 03/12/2018    HGB 9.7 03/16/2018    HCT 31.1 03/16/2018    MCV 94.8 03/12/2018    MCH 31.1 03/12/2018    MCHC 32.8 03/12/2018    RDW 13.8 03/12/2018     03/12/2018    MPV 9.6 03/12/2018     BMP:    Lab Results   Component Value Date     03/15/2018    K 4.3 03/15/2018    K 4.0 03/12/2018    CL 99 03/15/2018    CO2 32 03/15/2018    BUN 24 03/15/2018    LABALBU 3.8 03/11/2018    CREATININE 1.0 03/15/2018    CALCIUM 8.5 03/15/2018    GFRAA >60 03/15/2018    LABGLOM >60 03/15/2018     PT/INR:    Lab Results   Component Value Date    PROTIME 12.0 11/13/2017    INR 1.1 11/13/2017       3/12/18   DIAGNOSIS  POSITIVE FOR MALIGNANT CELLS    High grade urothelial carcinoma     SpO2 Readings from Last 1 Encounters:   03/16/18 98%      Results for [facility] (MRN [id number]) as of 3/13/2018 10:48   Ref.  Range 3/12/2018 16:10   Procalcitonin Latest Ref Range: 0.00 - 0.08 ng/mL 0.11 Avivac, DO   40 mg at 03/15/18 0906    furosemide (LASIX) tablet 40 mg  40 mg Oral BID Jori Chicasc DO   40 mg at 03/15/18 2049    insulin lispro protamine & lispro (HUMALOG MIX) (75-25) 100 UNIT per ML injection vial SUSP 40 Units  40 Units Subcutaneous BID  Jori Mehta DO   40 Units at 03/15/18 0906    isosorbide mononitrate (IMDUR) extended release tablet 120 mg  120 mg Oral Daily Jori Mehta DO   120 mg at 03/15/18 0905    levothyroxine (SYNTHROID) tablet 175 mcg  175 mcg Oral Daily Jori Mehta DO   175 mcg at 03/16/18 0653    losartan (COZAAR) tablet 12.5 mg  12.5 mg Oral Daily Jori Mehta DO   12.5 mg at 03/15/18 8878    metoprolol succinate (TOPROL XL) extended release tablet 50 mg  50 mg Oral Daily Jori Mehta DO   50 mg at 03/15/18 0905    0.9 % sodium chloride infusion   Intravenous Continuous Jori Mehta DO 75 mL/hr at 03/16/18 0327      sodium chloride flush 0.9 % injection 10 mL  10 mL Intravenous 2 times per day Jori Chicasc DO   10 mL at 03/15/18 2049    sodium chloride flush 0.9 % injection 10 mL  10 mL Intravenous PRN Jori Chicasc, DO        acetaminophen (TYLENOL) tablet 650 mg  650 mg Oral Q4H PRN Jori Chicasc, DO        ondansetron (ZOFRAN) injection 4 mg  4 mg Intravenous Q6H PRN Jori Mehta, DO        azithromycin (ZITHROMAX) 500 mg in D5W 250ml addavial  500 mg Intravenous Q24H Jori Chicasc, DO   Stopped at 03/15/18 1615    And    cefTRIAXone (ROCEPHIN) 1 g in dextrose 5 % 50 mL IVPB (vial-mate)  1 g Intravenous Q24H Jori Mehta DO   Stopped at 03/15/18 1726    insulin lispro (HUMALOG) injection vial 0-6 Units  0-6 Units Subcutaneous TID  Jori Mehta DO   Stopped at 03/15/18 1412    insulin lispro (HUMALOG) injection vial 0-3 Units  0-3 Units Subcutaneous Nightly Jori Mehta DO   1 Units at 03/14/18 2059    glucose (GLUTOSE) 40 % oral gel 15 g  15 g Oral PRN Jori Mehta DO        dextrose 50 % solution 12.5 g  12.5 g

## 2018-03-16 NOTE — PROGRESS NOTES
3/16/2018 9:03 AM  Service: Urology  Group: [facility] ([facility])    Tommy King  [id number]    Chief urologic compliant: Bladder cancer  HPI:  Patient is doing  His family is present  He was diagnosed with bladder cancer about 4 years ago  All options were discussed      Review of Systems:  Respiratory: negative for cough and hemoptysis  Cardiovascular: negative for chest pain and dyspnea  Gastrointestinal: negative for abdominal pain, diarrhea, nausea and vomiting  Derm: negative for rash and skin lesion(s)  Neurological: negative for seizures and tremors  Endocrine: negative for diabetic symptoms including polydipsia and polyuria  : As above in the HPI, otherwise negative  All other reviews are negative     Allergies: Ace inhibitors    Objective:   Vitals:    03/16/18 0811   BP:    Pulse:    Resp:    Temp:    SpO2: 98%       Neuro: A/A/O x3  Respiratory: non labored breathing  ABD: soft non-tender, non-distended  : garner clear  Ext: no clubbing, cyanosis, edema    Labs:   Recent Labs      03/14/18   0500  03/15/18   0445  03/16/18   0515   HGB  10.8*  11.1*  9.7*   HCT  33.6*  34.7*  31.1*       Recent Labs      03/15/18   1128   CREATININE  1.0       Assessment: Tommy King 80 y.o. male     Gross hematuria resolved  Bladder cancer post TURBT in 2014    Plan:    His family is present  All options were discussed  He has not had a cysto  He has not been cleared at present  Hold on  intervention at present  Cont the garner  Cont the prn irrigation    Elly Craven,    [facility]  Urology

## 2018-03-16 NOTE — PROGRESS NOTES
Dr. Ursula Toribio  Notified via answering service per RN request  Fran 7:56 AM 3/16/2018 Santana Tyson                        .

## 2018-03-16 NOTE — PROGRESS NOTES
calcified pleural plaques. 2. Stable, enlarged cardiomediastinal silhouette. . 3. Vascular calcifications thoracic aorta. 4. Suspected bibasilar infiltrate/pneumonia and bilateral pleural effusions. 5. Right azygos lobe. Us Retroperitoneal Complete    Result Date: 3/12/2018  Patient MRN:  [id number] : 1930 Age: 80 years Gender: Male Order Date:  3/12/2018 10:45 AM EXAM: US RETROPERITONEAL COMPLETE NUMBER OF IMAGES:  43 INDICATION:  HEMATURIA, GROSS  Technique: Multiplanar grayscale ultrasonographic images were obtained of the kidneys by the ultrasound technologist on 3/12/2018 10:45 AM. Adjacent structures are also visualized. Selected static images were presented to the radiologist for review. Comparison: No prior studies available for comparison. Findings: The kidneys demonstrate normal smooth contour and echotexture. The right kidney measures approximately 12.0 cm x 5.0 cm x 5.3 cm and the left kidney measures approximately 11.5 cm x 6.3 cm x 6.2 cm. There is a minimal amount of free fluid about the right kidney. No hydronephrosis, renal calculi or solid renal masses are demonstrated. There is a well-circumscribed anechoic nodule arising from the left kidney which measures 0.8 cm x 0.7 cm x 0.7 cm in size. A Pederson catheter is seen within the urinary bladder. 1. No evidence of hydronephrosis, renal calculi or solid renal masses. 2. Trace amount of right perinephric free fluid of uncertain etiology and clinical significance. Recommend clinical correlation. 3. Simple left renal cyst, as above.        Scheduled Meds:   albuterol  2.5 mg Nebulization Q4H WA    sodium chloride  4 mL Nebulization BID    QUEtiapine  25 mg Oral BID    formoterol  20 mcg Nebulization BID    budesonide  500 mcg Nebulization BID    spironolactone  25 mg Oral Daily    ketamine  50 mg Intramuscular Once    ketamine  25 mg Intramuscular Once    ketamine  25 mg Intravenous Once    sodium chloride flush  10 mL Intravenous 2

## 2018-03-16 NOTE — PROGRESS NOTES
rhonchi. Cardiac: Regular rate and rhythm, +S1S2, no murmurs apparent  Abdomen: Soft, nontender, +bowel sounds  Extremities: Moves all extremities x 4, no lower extremity edema  Neurologic: No focal motor deficits apparent, normal mood and affect  Peripheral Pulses: Intact posterior tibial pulses bilaterally    Intake/Output:    Intake/Output Summary (Last 24 hours) at 03/16/18 1623  Last data filed at 03/16/18 1100   Gross per 24 hour   Intake          1702.25 ml   Output             3580 ml   Net         -1877.75 ml     No intake/output data recorded. Laboratory Tests:  Recent Labs      03/15/18   1128  03/16/18   1047   NA  142  143   K  4.3  4.0   CL  99  101   CO2  32*  33*   BUN  24*  20   CREATININE  1.0  1.0   GLUCOSE  220*  79   CALCIUM  8.5*  8.4*     Lab Results   Component Value Date    MG 1.9 01/05/2018     No results for input(s): ALKPHOS, ALT, AST, PROT, BILITOT, BILIDIR, LABALBU in the last 72 hours.   Recent Labs      03/14/18   0500  03/15/18   0445  03/16/18   0515   HGB  10.8*  11.1*  9.7*   HCT  33.6*  34.7*  31.1*     Lab Results   Component Value Date    CKTOTAL 124 11/28/2012    CKMB 0.6 11/28/2012    TROPONINI 0.03 03/11/2018    TROPONINI 0.01 01/04/2018    TROPONINI <0.01 01/03/2018     Lab Results   Component Value Date    INR 1.1 11/13/2017    INR See Note (AA) 11/13/2017    INR 1.1 02/18/2014    PROTIME 12.0 11/13/2017    PROTIME See Note (AA) 11/13/2017    PROTIME 12.5 02/18/2014     Lab Results   Component Value Date    TSH 2.960 01/03/2018     Lab Results   Component Value Date    LABA1C 7.8 (H) 03/12/2018     No results found for: EAG  Lab Results   Component Value Date    CHOL 121 01/04/2018    CHOL 147 11/14/2017     Lab Results   Component Value Date    TRIG 71 01/04/2018    TRIG 52 11/14/2017     Lab Results   Component Value Date    HDL 49 01/04/2018    HDL 63 11/14/2017     Lab Results   Component Value Date    LDLCALC 58 01/04/2018    [address] 11/14/2017     Lab Results

## 2018-03-17 NOTE — PROGRESS NOTES
Upon pt's arrival to unit from emergency department. Pt was asleep and restraints were able to be removed and sitter placed in room.

## 2018-03-17 NOTE — PROGRESS NOTES
Pt went into vtach then this nurse and others went into assess pt. Confirmed code status. This nurse and Coralee Smart confirmed absence of respirations, pulse, blood pressure, and the presence of dilated pupils. Family at bedside. Contacted Dr. Ernesto Hsu, and  home.

## 2018-03-17 NOTE — DISCHARGE SUMMARY
Physician Discharge Summary     Patient ID:  Marcy Schulte  [id number]  31 y.o.  1930    Admit date: 3/11/2018    Discharge date and time: 3/21/2018     Admission Diagnoses: Altered mental status [R41.82]  Altered mental status [R41.82]    Discharge Diagnoses: Active Hospital Problems    Diagnosis    Palliative care by specialist [Z51.5]    Gross hematuria [R31.0]    Altered mental status [R41.82]    Chronic anticoagulation [Z79.01]    Atrial fibrillation ([address] [I48.91]    CHF (congestive heart failure), NYHA class III, acute on chronic, combined (HCC) [I50.43]    Acute delirium [R41.0]    Nonrheumatic aortic valve stenosis [I35.0]    Hypertension [I10]    Hyperlipidemia [E78.5]    Diabetes mellitus ([address] [R10.0]    Diastolic CHF, chronic ([address] [I50.32]    COPD with hypoxia ([address] [J44.9, R09.02]    Coronary artery disease involving native coronary artery of native heart without angina pectoris [I25.10]       Consults: cardiology, pulmonary/intensive care and urology    Procedures:  none    Hospital Course:  Admitted after presenting with a change in mental status and gross hematuria reported in the ER and with CT findings suggesting metastatic disease. The patients hospital course complicated by agitated acute delirium, rapid atrial fib, acute on chronic respiratory failure, CHF(diastolic) and COPD. Urine cytology positive for urothelial malignancy(high grade). Hematuria slowly improved with the discontinuation of Eliquis anticoagulation. The patient was clinically unstable to pursue scheduled cystourethroscopy. The radiologist notified me of a large left atrial clot on review of the CT. Code status changed following d/w family. The patient  after developing V-tachy rhythm with family at the bedside. Autopsy declined and the body discharged to the  home of the family wishes.         Disposition:  home       Note that over 30 minutes was spent in preparing discharge papers, discussing discharge with patient, medication review, etc.    Signed:  Tamia Alexander DO  3/21/2018  7:58 PM

## 2018-03-21 PROBLEM — R31.0 GROSS HEMATURIA: Status: ACTIVE | Noted: 2018-01-01

## (undated) DEVICE — SET CYSTOSCOPE 21FR

## (undated) DEVICE — LENS CYSTOSCOPE 30 DEG

## (undated) DEVICE — GRADUATE

## (undated) DEVICE — SET SURG BASIN MAYO REUSABLE

## (undated) DEVICE — CAMERA ENDOSCP VID HI DEF GEN